# Patient Record
Sex: FEMALE | Race: BLACK OR AFRICAN AMERICAN | NOT HISPANIC OR LATINO | URBAN - METROPOLITAN AREA
[De-identification: names, ages, dates, MRNs, and addresses within clinical notes are randomized per-mention and may not be internally consistent; named-entity substitution may affect disease eponyms.]

---

## 2020-12-03 ENCOUNTER — APPOINTMENT (RX ONLY)
Dept: URBAN - METROPOLITAN AREA CLINIC 116 | Facility: CLINIC | Age: 65
Setting detail: DERMATOLOGY
End: 2020-12-03

## 2020-12-03 DIAGNOSIS — L30.4 ERYTHEMA INTERTRIGO: ICD-10-CM

## 2020-12-03 DIAGNOSIS — L29.89 OTHER PRURITUS: ICD-10-CM

## 2020-12-03 DIAGNOSIS — K13.0 DISEASES OF LIPS: ICD-10-CM

## 2020-12-03 PROBLEM — L29.8 OTHER PRURITUS: Status: ACTIVE | Noted: 2020-12-03

## 2020-12-03 PROCEDURE — 99202 OFFICE O/P NEW SF 15 MIN: CPT

## 2020-12-03 PROCEDURE — ? COUNSELING

## 2020-12-03 PROCEDURE — ? PRESCRIPTION

## 2020-12-03 PROCEDURE — ? TREATMENT REGIMEN

## 2020-12-03 RX ORDER — CICLOPIROX OLAMINE 7.7 MG/G
CREAM TOPICAL
Qty: 1 | Refills: 0 | Status: ERX | COMMUNITY
Start: 2020-12-03

## 2020-12-03 RX ORDER — TRIAMCINOLONE ACETONIDE 0.25 MG/G
CREAM TOPICAL
Qty: 1 | Refills: 0 | Status: ERX | COMMUNITY
Start: 2020-12-03

## 2020-12-03 RX ORDER — AMOXICILLIN AND CLAVULANATE POTASSIUM 875; 125 MG/1; 1/1
1 TAB TABLET, FILM COATED ORAL
Qty: 20 | Refills: 0 | Status: ERX | COMMUNITY
Start: 2020-12-03

## 2020-12-03 RX ADMIN — TRIAMCINOLONE ACETONIDE 1: 0.25 CREAM TOPICAL at 00:00

## 2020-12-03 RX ADMIN — CICLOPIROX OLAMINE: 7.7 CREAM TOPICAL at 00:00

## 2020-12-03 RX ADMIN — AMOXICILLIN AND CLAVULANATE POTASSIUM 1 TAB: 875; 125 TABLET, FILM COATED ORAL at 00:00

## 2020-12-03 ASSESSMENT — LOCATION ZONE DERM
LOCATION ZONE: LIP
LOCATION ZONE: TRUNK
LOCATION ZONE: FACE

## 2020-12-03 ASSESSMENT — LOCATION SIMPLE DESCRIPTION DERM
LOCATION SIMPLE: LEFT LIP
LOCATION SIMPLE: ABDOMEN
LOCATION SIMPLE: RIGHT UPPER BACK
LOCATION SIMPLE: RIGHT LIP
LOCATION SIMPLE: RIGHT CHEEK

## 2020-12-03 ASSESSMENT — LOCATION DETAILED DESCRIPTION DERM
LOCATION DETAILED: RIGHT MEDIAL UPPER BACK
LOCATION DETAILED: RIGHT LATERAL ABDOMEN
LOCATION DETAILED: LEFT LATERAL ABDOMEN
LOCATION DETAILED: LEFT LOWER CUTANEOUS LIP
LOCATION DETAILED: RIGHT UPPER CUTANEOUS LIP
LOCATION DETAILED: RIGHT MEDIAL BUCCAL CHEEK
LOCATION DETAILED: LEFT UPPER CUTANEOUS LIP
LOCATION DETAILED: LEFT ORAL COMMISSURE

## 2020-12-29 ENCOUNTER — APPOINTMENT (RX ONLY)
Dept: URBAN - METROPOLITAN AREA CLINIC 116 | Facility: CLINIC | Age: 65
Setting detail: DERMATOLOGY
End: 2020-12-29

## 2020-12-29 DIAGNOSIS — L80 VITILIGO: ICD-10-CM

## 2020-12-29 DIAGNOSIS — L30.4 ERYTHEMA INTERTRIGO: ICD-10-CM | Status: RESOLVED

## 2020-12-29 PROCEDURE — ? TREATMENT REGIMEN

## 2020-12-29 PROCEDURE — ? PRESCRIPTION

## 2020-12-29 PROCEDURE — ? COUNSELING

## 2020-12-29 PROCEDURE — 99213 OFFICE O/P EST LOW 20 MIN: CPT

## 2020-12-29 RX ORDER — TACROLIMUS 0.3 MG/G
OINTMENT TOPICAL BID
Qty: 1 | Refills: 5 | Status: ERX | COMMUNITY
Start: 2020-12-29

## 2020-12-29 RX ADMIN — TACROLIMUS 1: 0.3 OINTMENT TOPICAL at 00:00

## 2020-12-29 ASSESSMENT — LOCATION SIMPLE DESCRIPTION DERM
LOCATION SIMPLE: RIGHT SMALL FINGER
LOCATION SIMPLE: LEFT RING FINGER
LOCATION SIMPLE: LEFT FOREHEAD
LOCATION SIMPLE: RIGHT LIP
LOCATION SIMPLE: RIGHT EYEBROW
LOCATION SIMPLE: LEFT EYEBROW
LOCATION SIMPLE: LEFT LIP
LOCATION SIMPLE: LEFT SMALL FINGER
LOCATION SIMPLE: ABDOMEN

## 2020-12-29 ASSESSMENT — LOCATION DETAILED DESCRIPTION DERM
LOCATION DETAILED: RIGHT MID DORSAL SMALL FINGER
LOCATION DETAILED: LEFT DISTAL DORSAL SMALL FINGER
LOCATION DETAILED: RIGHT LATERAL EYEBROW
LOCATION DETAILED: LEFT INFERIOR VERMILION LIP
LOCATION DETAILED: RIGHT LATERAL ABDOMEN
LOCATION DETAILED: LEFT LATERAL EYEBROW
LOCATION DETAILED: LEFT SUPERIOR VERMILION LIP
LOCATION DETAILED: LEFT RING FINGER DISTAL INTERPHALANGEAL JOINT
LOCATION DETAILED: RIGHT UPPER CUTANEOUS LIP
LOCATION DETAILED: LEFT INFERIOR LATERAL FOREHEAD
LOCATION DETAILED: LEFT LATERAL ABDOMEN

## 2020-12-29 ASSESSMENT — LOCATION ZONE DERM
LOCATION ZONE: TRUNK
LOCATION ZONE: LIP
LOCATION ZONE: FINGER
LOCATION ZONE: FACE

## 2021-05-27 ENCOUNTER — APPOINTMENT (RX ONLY)
Dept: URBAN - METROPOLITAN AREA CLINIC 116 | Facility: CLINIC | Age: 66
Setting detail: DERMATOLOGY
End: 2021-05-27

## 2021-05-27 VITALS — TEMPERATURE: 97.3 F

## 2021-05-27 DIAGNOSIS — L80 VITILIGO: ICD-10-CM

## 2021-05-27 PROCEDURE — ? TREATMENT REGIMEN

## 2021-05-27 PROCEDURE — 99213 OFFICE O/P EST LOW 20 MIN: CPT

## 2021-05-27 PROCEDURE — ? COUNSELING

## 2021-05-27 PROCEDURE — ? PRESCRIPTION

## 2021-05-27 RX ORDER — TACROLIMUS 1 MG/G
1 OINTMENT TOPICAL BID
Qty: 1 | Refills: 3 | Status: ERX | COMMUNITY
Start: 2021-05-27

## 2021-05-27 RX ORDER — HYDROCORTISONE VALERATE 2 MG/G
1 OINTMENT TOPICAL QD
Qty: 1 | Refills: 0 | Status: ERX | COMMUNITY
Start: 2021-05-27

## 2021-05-27 RX ADMIN — HYDROCORTISONE VALERATE 1: 2 OINTMENT TOPICAL at 00:00

## 2021-05-27 RX ADMIN — TACROLIMUS 1: 1 OINTMENT TOPICAL at 00:00

## 2021-05-27 ASSESSMENT — LOCATION DETAILED DESCRIPTION DERM
LOCATION DETAILED: LEFT SUPERIOR VERMILION LIP
LOCATION DETAILED: LEFT INFERIOR VERMILION LIP
LOCATION DETAILED: RIGHT MID DORSAL SMALL FINGER
LOCATION DETAILED: RIGHT UPPER CUTANEOUS LIP
LOCATION DETAILED: LEFT DISTAL DORSAL SMALL FINGER
LOCATION DETAILED: RIGHT LATERAL EYEBROW
LOCATION DETAILED: LEFT INFERIOR LATERAL FOREHEAD
LOCATION DETAILED: LEFT LATERAL EYEBROW
LOCATION DETAILED: LEFT RING FINGER DISTAL INTERPHALANGEAL JOINT

## 2021-05-27 ASSESSMENT — LOCATION SIMPLE DESCRIPTION DERM
LOCATION SIMPLE: RIGHT EYEBROW
LOCATION SIMPLE: LEFT SMALL FINGER
LOCATION SIMPLE: LEFT RING FINGER
LOCATION SIMPLE: LEFT FOREHEAD
LOCATION SIMPLE: RIGHT LIP
LOCATION SIMPLE: LEFT EYEBROW
LOCATION SIMPLE: RIGHT SMALL FINGER
LOCATION SIMPLE: LEFT LIP

## 2021-05-27 ASSESSMENT — LOCATION ZONE DERM
LOCATION ZONE: FINGER
LOCATION ZONE: FACE
LOCATION ZONE: LIP

## 2021-05-27 NOTE — PROCEDURE: TREATMENT REGIMEN
Detail Level: Simple
Initiate Treatment: Lidex ointment Daily Monday- Thursday at night
Continue Regimen: Protopic daily to lip.
Plan: Patient is to see Renford Aschoff for further treatment options.

## 2021-07-01 ENCOUNTER — APPOINTMENT (RX ONLY)
Dept: URBAN - METROPOLITAN AREA CLINIC 116 | Facility: CLINIC | Age: 66
Setting detail: DERMATOLOGY
End: 2021-07-01

## 2021-07-01 DIAGNOSIS — L80 VITILIGO: ICD-10-CM

## 2021-07-01 PROCEDURE — ? MEDICAL CONSULTATION: EXCIMER LASER

## 2021-07-01 PROCEDURE — ? PRESCRIPTION MEDICATION MANAGEMENT

## 2021-07-01 PROCEDURE — ? COUNSELING

## 2021-07-01 PROCEDURE — ? NARROW BAND UVB ORDER

## 2021-07-01 PROCEDURE — 99214 OFFICE O/P EST MOD 30 MIN: CPT

## 2021-07-01 ASSESSMENT — LOCATION DETAILED DESCRIPTION DERM
LOCATION DETAILED: LEFT DISTAL DORSAL SMALL FINGER
LOCATION DETAILED: LEFT SUPERIOR VERMILION LIP
LOCATION DETAILED: RIGHT RADIAL DORSAL HAND
LOCATION DETAILED: LEFT LATERAL EYEBROW
LOCATION DETAILED: LEFT INFERIOR LATERAL FOREHEAD
LOCATION DETAILED: RIGHT UPPER CUTANEOUS LIP
LOCATION DETAILED: LEFT INFERIOR VERMILION LIP
LOCATION DETAILED: LEFT RING FINGER DISTAL INTERPHALANGEAL JOINT
LOCATION DETAILED: RIGHT MID DORSAL SMALL FINGER
LOCATION DETAILED: 4TH WEB SPACE LEFT HAND
LOCATION DETAILED: LEFT INFERIOR MEDIAL FOREHEAD
LOCATION DETAILED: RIGHT LATERAL EYEBROW

## 2021-07-01 ASSESSMENT — LOCATION SIMPLE DESCRIPTION DERM
LOCATION SIMPLE: RIGHT HAND
LOCATION SIMPLE: LEFT LIP
LOCATION SIMPLE: LEFT EYEBROW
LOCATION SIMPLE: LEFT FOREHEAD
LOCATION SIMPLE: LEFT SMALL FINGER
LOCATION SIMPLE: RIGHT SMALL FINGER
LOCATION SIMPLE: LEFT RING FINGER
LOCATION SIMPLE: LEFT HAND
LOCATION SIMPLE: RIGHT LIP
LOCATION SIMPLE: RIGHT EYEBROW

## 2021-07-01 ASSESSMENT — LOCATION ZONE DERM
LOCATION ZONE: FACE
LOCATION ZONE: FINGER
LOCATION ZONE: HAND
LOCATION ZONE: LIP

## 2021-07-01 NOTE — PROCEDURE: PRESCRIPTION MEDICATION MANAGEMENT
Detail Level: Zone
Render In Strict Bullet Format?: No
Continue Regimen: Lidex ointment Daily Monday- Thursday at night. \\nProtopic daily to lip.
Plan for Excimer laser if not covered plan NBUVB

## 2021-07-01 NOTE — PROCEDURE: NARROW BAND UVB ORDER
Additional Comments: 3 x 30\\nSkin type 1
Detail Level: Generalized
Consent: Written consent obtained. The risks were reviewed with the patient including but not limited to: burn, pigmentary changes, pain, blistering, scabbing, redness, increased risk of skin cancers, and the remote possibility of scarring.
Frequency: TIW
Protocol: NBUVB
Dose: to be determined by the phototherapy office
Alcohol induced circadian rhythm sleep disorders

## 2021-07-27 ENCOUNTER — APPOINTMENT (RX ONLY)
Dept: URBAN - METROPOLITAN AREA CLINIC 116 | Facility: CLINIC | Age: 66
Setting detail: DERMATOLOGY
End: 2021-07-27

## 2021-07-27 DIAGNOSIS — L80 VITILIGO: ICD-10-CM

## 2021-07-27 PROCEDURE — 96920 EXCIMER LSR PSRIASIS<250SQCM: CPT

## 2021-07-27 PROCEDURE — ? EXCIMER LASER

## 2021-07-27 ASSESSMENT — LOCATION SIMPLE DESCRIPTION DERM: LOCATION SIMPLE: RIGHT CHEEK

## 2021-07-27 ASSESSMENT — LOCATION DETAILED DESCRIPTION DERM: LOCATION DETAILED: RIGHT INFERIOR CENTRAL MALAR CHEEK

## 2021-07-27 ASSESSMENT — LOCATION ZONE DERM: LOCATION ZONE: FACE

## 2021-07-27 NOTE — PROCEDURE: EXCIMER LASER
Consent: Written consent obtained, risks reviewed including but not limited to crusting, scabbing, blistering, scarring, darker or lighter pigmentary change, incidental hair removal, bruising, and/or incomplete removal.
Mode: tile
Post-Care Instructions: I reviewed with the patient in detail post-care instructions. Patient should stay away from the sun and wear sun protection until treated areas are fully healed.
Device Serial Number (Optional): [email or web address]
Detail Level: Detailed
Location #1: face and fingers
Total Square Area In Cm2 (Required For Proper Billing- Whole Numbers Only Please): 95
Fluence #1 (J/Cm2 Or Mj/Cm2): 150 mj
Fluence Units: mJ/cm2
Spot Size: 2 x 2 cm
Treatment Number: 1

## 2021-07-29 ENCOUNTER — APPOINTMENT (RX ONLY)
Dept: URBAN - METROPOLITAN AREA CLINIC 116 | Facility: CLINIC | Age: 66
Setting detail: DERMATOLOGY
End: 2021-07-29

## 2021-08-03 ENCOUNTER — APPOINTMENT (RX ONLY)
Dept: URBAN - METROPOLITAN AREA CLINIC 116 | Facility: CLINIC | Age: 66
Setting detail: DERMATOLOGY
End: 2021-08-03

## 2021-08-03 DIAGNOSIS — L80 VITILIGO: ICD-10-CM

## 2021-08-03 PROCEDURE — 96920 EXCIMER LSR PSRIASIS<250SQCM: CPT

## 2021-08-03 PROCEDURE — ? EXCIMER LASER

## 2021-08-03 ASSESSMENT — LOCATION SIMPLE DESCRIPTION DERM: LOCATION SIMPLE: RIGHT CHEEK

## 2021-08-03 ASSESSMENT — LOCATION ZONE DERM: LOCATION ZONE: FACE

## 2021-08-03 ASSESSMENT — LOCATION DETAILED DESCRIPTION DERM: LOCATION DETAILED: RIGHT INFERIOR CENTRAL MALAR CHEEK

## 2021-08-05 ENCOUNTER — APPOINTMENT (RX ONLY)
Dept: URBAN - METROPOLITAN AREA CLINIC 116 | Facility: CLINIC | Age: 66
Setting detail: DERMATOLOGY
End: 2021-08-05

## 2021-08-05 DIAGNOSIS — L80 VITILIGO: ICD-10-CM

## 2021-08-05 PROCEDURE — 96920 EXCIMER LSR PSRIASIS<250SQCM: CPT

## 2021-08-05 PROCEDURE — ? EXCIMER LASER

## 2021-08-05 ASSESSMENT — LOCATION SIMPLE DESCRIPTION DERM: LOCATION SIMPLE: RIGHT CHEEK

## 2021-08-05 ASSESSMENT — LOCATION ZONE DERM: LOCATION ZONE: FACE

## 2021-08-05 ASSESSMENT — LOCATION DETAILED DESCRIPTION DERM: LOCATION DETAILED: RIGHT INFERIOR CENTRAL MALAR CHEEK

## 2021-08-05 NOTE — PROCEDURE: EXCIMER LASER
Consent: Written consent obtained, risks reviewed including but not limited to crusting, scabbing, blistering, scarring, darker or lighter pigmentary change, incidental hair removal, bruising, and/or incomplete removal.
Mode: tile
Post-Care Instructions: I reviewed with the patient in detail post-care instructions. Patient should stay away from the sun and wear sun protection until treated areas are fully healed.
Device Serial Number (Optional): [email or web address]
Detail Level: Detailed
Location #1: face and fingers
Total Square Area In Cm2 (Required For Proper Billing- Whole Numbers Only Please): [address]
Fluence #1 (J/Cm2 Or Mj/Cm2): 220  mj
Fluence Units: mJ/cm2
Spot Size: 2 x 2 cm
Treatment Number: 3

## 2021-08-10 ENCOUNTER — APPOINTMENT (RX ONLY)
Dept: URBAN - METROPOLITAN AREA CLINIC 116 | Facility: CLINIC | Age: 66
Setting detail: DERMATOLOGY
End: 2021-08-10

## 2021-08-10 DIAGNOSIS — L80 VITILIGO: ICD-10-CM

## 2021-08-10 PROCEDURE — 96920 EXCIMER LSR PSRIASIS<250SQCM: CPT

## 2021-08-10 PROCEDURE — ? EXCIMER LASER

## 2021-08-10 ASSESSMENT — LOCATION SIMPLE DESCRIPTION DERM: LOCATION SIMPLE: RIGHT CHEEK

## 2021-08-10 ASSESSMENT — LOCATION ZONE DERM: LOCATION ZONE: FACE

## 2021-08-10 ASSESSMENT — LOCATION DETAILED DESCRIPTION DERM: LOCATION DETAILED: RIGHT INFERIOR CENTRAL MALAR CHEEK

## 2021-08-10 NOTE — PROCEDURE: EXCIMER LASER
Consent: Written consent obtained, risks reviewed including but not limited to crusting, scabbing, blistering, scarring, darker or lighter pigmentary change, incidental hair removal, bruising, and/or incomplete removal.
Mode: tile
Post-Care Instructions: I reviewed with the patient in detail post-care instructions. Patient should stay away from the sun and wear sun protection until treated areas are fully healed.
Device Serial Number (Optional): [email or web address]
Detail Level: Detailed
Location #1: face and fingers
Total Square Area In Cm2 (Required For Proper Billing- Whole Numbers Only Please): One [facility]
Fluence #1 (J/Cm2 Or Mj/Cm2): 270  mj
Fluence Units: mJ/cm2
Spot Size: 2 x 2 cm
Treatment Number: 4

## 2021-08-12 ENCOUNTER — APPOINTMENT (RX ONLY)
Dept: URBAN - METROPOLITAN AREA CLINIC 116 | Facility: CLINIC | Age: 66
Setting detail: DERMATOLOGY
End: 2021-08-12

## 2021-08-12 DIAGNOSIS — L80 VITILIGO: ICD-10-CM

## 2021-08-12 PROCEDURE — ? EXCIMER LASER

## 2021-08-12 PROCEDURE — 96920 EXCIMER LSR PSRIASIS<250SQCM: CPT

## 2021-08-12 ASSESSMENT — LOCATION ZONE DERM: LOCATION ZONE: FACE

## 2021-08-12 ASSESSMENT — LOCATION SIMPLE DESCRIPTION DERM: LOCATION SIMPLE: RIGHT CHEEK

## 2021-08-12 ASSESSMENT — LOCATION DETAILED DESCRIPTION DERM: LOCATION DETAILED: RIGHT INFERIOR CENTRAL MALAR CHEEK

## 2021-08-12 NOTE — PROCEDURE: EXCIMER LASER
Consent: Written consent obtained, risks reviewed including but not limited to crusting, scabbing, blistering, scarring, darker or lighter pigmentary change, incidental hair removal, bruising, and/or incomplete removal.
Mode: tile
Post-Care Instructions: I reviewed with the patient in detail post-care instructions. Patient should stay away from the sun and wear sun protection until treated areas are fully healed.
Device Serial Number (Optional): [email or web address]
Detail Level: Detailed
Location #1: face and fingers
Total Square Area In Cm2 (Required For Proper Billing- Whole Numbers Only Please): One [facility]
Fluence #1 (J/Cm2 Or Mj/Cm2): 330 mj
Fluence Units: mJ/cm2
Spot Size: 2 x 2 cm
Treatment Number: 5

## 2021-08-19 ENCOUNTER — APPOINTMENT (RX ONLY)
Dept: URBAN - METROPOLITAN AREA CLINIC 116 | Facility: CLINIC | Age: 66
Setting detail: DERMATOLOGY
End: 2021-08-19

## 2021-08-19 DIAGNOSIS — L80 VITILIGO: ICD-10-CM

## 2021-08-19 PROCEDURE — ? EXCIMER LASER

## 2021-08-19 PROCEDURE — 96920 EXCIMER LSR PSRIASIS<250SQCM: CPT

## 2021-08-19 ASSESSMENT — LOCATION ZONE DERM: LOCATION ZONE: FACE

## 2021-08-19 ASSESSMENT — LOCATION SIMPLE DESCRIPTION DERM: LOCATION SIMPLE: RIGHT CHEEK

## 2021-08-19 ASSESSMENT — LOCATION DETAILED DESCRIPTION DERM: LOCATION DETAILED: RIGHT INFERIOR CENTRAL MALAR CHEEK

## 2021-08-19 NOTE — PROCEDURE: EXCIMER LASER
Consent: Written consent obtained, risks reviewed including but not limited to crusting, scabbing, blistering, scarring, darker or lighter pigmentary change, incidental hair removal, bruising, and/or incomplete removal.
Mode: tile
Post-Care Instructions: I reviewed with the patient in detail post-care instructions. Patient should stay away from the sun and wear sun protection until treated areas are fully healed.
Device Serial Number (Optional): [email or web address]/[email or web address]
Detail Level: Detailed
Location #1: face and fingers
Total Square Area In Cm2 (Required For Proper Billing- Whole Numbers Only Please): One [facility]
Fluence #1 (J/Cm2 Or Mj/Cm2): 360  mj
Fluence Units: mJ/cm2
Spot Size: 2 x 2 cm
Treatment Number: 6

## 2021-08-23 ENCOUNTER — APPOINTMENT (RX ONLY)
Dept: URBAN - METROPOLITAN AREA CLINIC 116 | Facility: CLINIC | Age: 66
Setting detail: DERMATOLOGY
End: 2021-08-23

## 2021-08-23 DIAGNOSIS — L80 VITILIGO: ICD-10-CM

## 2021-08-23 PROCEDURE — 96920 EXCIMER LSR PSRIASIS<250SQCM: CPT

## 2021-08-23 PROCEDURE — ? EXCIMER LASER

## 2021-08-23 ASSESSMENT — LOCATION DETAILED DESCRIPTION DERM: LOCATION DETAILED: RIGHT INFERIOR CENTRAL MALAR CHEEK

## 2021-08-23 ASSESSMENT — LOCATION SIMPLE DESCRIPTION DERM: LOCATION SIMPLE: RIGHT CHEEK

## 2021-08-23 ASSESSMENT — LOCATION ZONE DERM: LOCATION ZONE: FACE

## 2021-08-23 NOTE — PROCEDURE: EXCIMER LASER
Consent: Written consent obtained, risks reviewed including but not limited to crusting, scabbing, blistering, scarring, darker or lighter pigmentary change, incidental hair removal, bruising, and/or incomplete removal.
Mode: tile
Post-Care Instructions: I reviewed with the patient in detail post-care instructions. Patient should stay away from the sun and wear sun protection until treated areas are fully healed.
Device Serial Number (Optional): [email or web address]/[email or web address]
Detail Level: Detailed
Location #1: face and fingers
Total Square Area In Cm2 (Required For Proper Billing- Whole Numbers Only Please): [address]
Fluence #1 (J/Cm2 Or Mj/Cm2): 420   mj
Fluence Units: mJ/cm2
Spot Size: 2 x 2 cm
Treatment Number: 7

## 2021-08-26 ENCOUNTER — APPOINTMENT (RX ONLY)
Dept: URBAN - METROPOLITAN AREA CLINIC 116 | Facility: CLINIC | Age: 66
Setting detail: DERMATOLOGY
End: 2021-08-26

## 2021-08-26 DIAGNOSIS — L80 VITILIGO: ICD-10-CM

## 2021-08-26 PROCEDURE — ? EXCIMER LASER

## 2021-08-26 PROCEDURE — 96920 EXCIMER LSR PSRIASIS<250SQCM: CPT

## 2021-08-26 ASSESSMENT — LOCATION ZONE DERM: LOCATION ZONE: FACE

## 2021-08-26 ASSESSMENT — LOCATION SIMPLE DESCRIPTION DERM: LOCATION SIMPLE: RIGHT CHEEK

## 2021-08-26 ASSESSMENT — LOCATION DETAILED DESCRIPTION DERM: LOCATION DETAILED: RIGHT INFERIOR CENTRAL MALAR CHEEK

## 2021-08-26 NOTE — PROCEDURE: EXCIMER LASER
Consent: Written consent obtained, risks reviewed including but not limited to crusting, scabbing, blistering, scarring, darker or lighter pigmentary change, incidental hair removal, bruising, and/or incomplete removal.
Mode: tile
Post-Care Instructions: I reviewed with the patient in detail post-care instructions. Patient should stay away from the sun and wear sun protection until treated areas are fully healed.
Device Serial Number (Optional): [email or web address]
Detail Level: Detailed
Location #1: face and fingers
Total Square Area In Cm2 (Required For Proper Billing- Whole Numbers Only Please): [address]
Fluence #1 (J/Cm2 Or Mj/Cm2): 450   mj
Fluence Units: mJ/cm2
Spot Size: 2 x 2 cm
Treatment Number: 8

## 2021-08-31 ENCOUNTER — APPOINTMENT (RX ONLY)
Dept: URBAN - METROPOLITAN AREA CLINIC 116 | Facility: CLINIC | Age: 66
Setting detail: DERMATOLOGY
End: 2021-08-31

## 2021-08-31 DIAGNOSIS — L80 VITILIGO: ICD-10-CM

## 2021-08-31 PROCEDURE — 96920 EXCIMER LSR PSRIASIS<250SQCM: CPT

## 2021-08-31 PROCEDURE — ? EXCIMER LASER

## 2021-08-31 ASSESSMENT — LOCATION ZONE DERM: LOCATION ZONE: FACE

## 2021-08-31 ASSESSMENT — LOCATION DETAILED DESCRIPTION DERM: LOCATION DETAILED: RIGHT INFERIOR CENTRAL MALAR CHEEK

## 2021-08-31 ASSESSMENT — LOCATION SIMPLE DESCRIPTION DERM: LOCATION SIMPLE: RIGHT CHEEK

## 2021-08-31 NOTE — PROCEDURE: EXCIMER LASER
Consent: Written consent obtained, risks reviewed including but not limited to crusting, scabbing, blistering, scarring, darker or lighter pigmentary change, incidental hair removal, bruising, and/or incomplete removal.
Mode: tile
Post-Care Instructions: I reviewed with the patient in detail post-care instructions. Patient should stay away from the sun and wear sun protection until treated areas are fully healed.
Detail Level: Detailed
Location #1: face and fingers
Comments: Pt. states she tolerated last tx well, no complaints.
Total Square Area In Cm2 (Required For Proper Billing- Whole Numbers Only Please): [address] [facility] [address]
Fluence #1 (J/Cm2 Or Mj/Cm2): 450 mJ
Fluence Units: mJ/cm2
Spot Size: 3 x 3 cm
Treatment Number: 9

## 2021-09-02 ENCOUNTER — APPOINTMENT (RX ONLY)
Dept: URBAN - METROPOLITAN AREA CLINIC 116 | Facility: CLINIC | Age: 66
Setting detail: DERMATOLOGY
End: 2021-09-02

## 2021-09-02 DIAGNOSIS — L80 VITILIGO: ICD-10-CM

## 2021-09-02 PROCEDURE — 96920 EXCIMER LSR PSRIASIS<250SQCM: CPT

## 2021-09-02 PROCEDURE — ? EXCIMER LASER

## 2021-09-02 ASSESSMENT — LOCATION SIMPLE DESCRIPTION DERM: LOCATION SIMPLE: RIGHT CHEEK

## 2021-09-02 ASSESSMENT — LOCATION DETAILED DESCRIPTION DERM: LOCATION DETAILED: RIGHT INFERIOR CENTRAL MALAR CHEEK

## 2021-09-02 ASSESSMENT — LOCATION ZONE DERM: LOCATION ZONE: FACE

## 2021-09-02 NOTE — PROCEDURE: EXCIMER LASER
Consent: Written consent obtained, risks reviewed including but not limited to crusting, scabbing, blistering, scarring, darker or lighter pigmentary change, incidental hair removal, bruising, and/or incomplete removal.
Mode: tile
Post-Care Instructions: I reviewed with the patient in detail post-care instructions. Patient should stay away from the sun and wear sun protection until treated areas are fully healed.
Detail Level: Detailed
Location #1: face and fingers
Comments: Pt. states she tolerated last tx well, no complaints.
Total Square Area In Cm2 (Required For Proper Billing- Whole Numbers Only Please): One [facility]
Fluence #1 (J/Cm2 Or Mj/Cm2): 540 mJ
Fluence Units: mJ/cm2
Spot Size: 3 x 3 cm
Treatment Number: 10

## 2021-09-07 ENCOUNTER — APPOINTMENT (RX ONLY)
Dept: URBAN - METROPOLITAN AREA CLINIC 116 | Facility: CLINIC | Age: 66
Setting detail: DERMATOLOGY
End: 2021-09-07

## 2021-09-07 DIAGNOSIS — L80 VITILIGO: ICD-10-CM

## 2021-09-07 PROCEDURE — 96920 EXCIMER LSR PSRIASIS<250SQCM: CPT

## 2021-09-07 PROCEDURE — ? EXCIMER LASER

## 2021-09-07 ASSESSMENT — LOCATION ZONE DERM
LOCATION ZONE: FACE
LOCATION ZONE: FINGER

## 2021-09-07 ASSESSMENT — LOCATION DETAILED DESCRIPTION DERM
LOCATION DETAILED: RIGHT MID DORSAL MIDDLE FINGER
LOCATION DETAILED: LEFT MID DORSAL MIDDLE FINGER
LOCATION DETAILED: RIGHT INFERIOR CENTRAL MALAR CHEEK

## 2021-09-07 ASSESSMENT — LOCATION SIMPLE DESCRIPTION DERM
LOCATION SIMPLE: LEFT MIDDLE FINGER
LOCATION SIMPLE: RIGHT CHEEK
LOCATION SIMPLE: RIGHT MIDDLE FINGER

## 2021-09-07 NOTE — PROCEDURE: EXCIMER LASER
Consent: Written consent obtained, risks reviewed including but not limited to crusting, scabbing, blistering, scarring, darker or lighter pigmentary change, incidental hair removal, bruising, and/or incomplete removal.
Mode: tile
Post-Care Instructions: I reviewed with the patient in detail post-care instructions. Patient should stay away from the sun and wear sun protection until treated areas are fully healed.
Detail Level: Detailed
Location #1: face and fingers
Comments on Previous Treatment: Pt. states she tolerated last tx well, no complaints. \\nChanges in Treatment Protocol: Decrease Excimer Laser dosing by 40% per Shauna Ahumada with [address] 9/7/21.
Total Square Area In Cm2 (Required For Proper Billing- Whole Numbers Only Please): [id number] 
Fluence #1 (J/Cm2 Or Mj/Cm2): [address] [facility]
Fluence Units: mJ/cm2
Spot Size: 3 x 3 cm
Treatment Number: [address]

## 2021-09-09 ENCOUNTER — APPOINTMENT (RX ONLY)
Dept: URBAN - METROPOLITAN AREA CLINIC 116 | Facility: CLINIC | Age: 66
Setting detail: DERMATOLOGY
End: 2021-09-09

## 2021-09-09 DIAGNOSIS — L80 VITILIGO: ICD-10-CM

## 2021-09-09 PROCEDURE — 96920 EXCIMER LSR PSRIASIS<250SQCM: CPT

## 2021-09-09 PROCEDURE — ? EXCIMER LASER

## 2021-09-09 ASSESSMENT — LOCATION DETAILED DESCRIPTION DERM
LOCATION DETAILED: RIGHT INFERIOR CENTRAL MALAR CHEEK
LOCATION DETAILED: LEFT MID DORSAL MIDDLE FINGER
LOCATION DETAILED: RIGHT MID DORSAL MIDDLE FINGER

## 2021-09-09 ASSESSMENT — LOCATION SIMPLE DESCRIPTION DERM
LOCATION SIMPLE: RIGHT MIDDLE FINGER
LOCATION SIMPLE: RIGHT CHEEK
LOCATION SIMPLE: LEFT MIDDLE FINGER

## 2021-09-09 ASSESSMENT — LOCATION ZONE DERM
LOCATION ZONE: FACE
LOCATION ZONE: FINGER

## 2021-09-09 NOTE — PROCEDURE: EXCIMER LASER
Consent: Written consent obtained, risks reviewed including but not limited to crusting, scabbing, blistering, scarring, darker or lighter pigmentary change, incidental hair removal, bruising, and/or incomplete removal.
Mode: tile
Post-Care Instructions: I reviewed with the patient in detail post-care instructions. Patient should stay away from the sun and wear sun protection until treated areas are fully healed.
Detail Level: Detailed
Location #1: face and fingers
Comments on Previous Treatment: Pt. states she tolerated last tx well, no complaints. \\nChanges in Treatment Protocol: Decrease Excimer Laser dosing by 40% per Otilia Son 51 9/7/21.
Total Square Area In Cm2 (Required For Proper Billing- Whole Numbers Only Please): Tye
Fluence #1 (J/Cm2 Or Mj/Cm2): [address] [facility]
Fluence Units: mJ/cm2
Spot Size: 3 x 3 cm
Treatment Number: 145 [address]

## 2021-09-14 ENCOUNTER — APPOINTMENT (RX ONLY)
Dept: URBAN - METROPOLITAN AREA CLINIC 116 | Facility: CLINIC | Age: 66
Setting detail: DERMATOLOGY
End: 2021-09-14

## 2021-09-14 DIAGNOSIS — L80 VITILIGO: ICD-10-CM

## 2021-09-14 PROCEDURE — 96920 EXCIMER LSR PSRIASIS<250SQCM: CPT

## 2021-09-14 PROCEDURE — ? EXCIMER LASER

## 2021-09-14 ASSESSMENT — LOCATION ZONE DERM
LOCATION ZONE: FACE
LOCATION ZONE: FINGER

## 2021-09-14 ASSESSMENT — LOCATION SIMPLE DESCRIPTION DERM
LOCATION SIMPLE: LEFT MIDDLE FINGER
LOCATION SIMPLE: RIGHT MIDDLE FINGER
LOCATION SIMPLE: RIGHT CHEEK

## 2021-09-14 NOTE — PROCEDURE: EXCIMER LASER
Consent: Written consent obtained, risks reviewed including but not limited to crusting, scabbing, blistering, scarring, darker or lighter pigmentary change, incidental hair removal, bruising, and/or incomplete removal.
Mode: tile
Post-Care Instructions: I reviewed with the patient in detail post-care instructions. Patient should stay away from the sun and wear sun protection until treated areas are fully healed.
Detail Level: Detailed
Location #1: face and fingers
Comments on Previous Treatment: Pt. states she tolerated last tx well, no complaints. \\nChanges in Treatment Protocol: Decrease Excimer Laser dosing by 40% per Uli Son 51 9/7/21.
Total Square Area In Cm2 (Required For Proper Billing- Whole Numbers Only Please): Tye
Fluence #1 (J/Cm2 Or Mj/Cm2): 333 [facility]
Fluence Units: mJ/cm2
Spot Size: 3 x 3 cm
Treatment Number: 145 [address]

## 2021-09-16 ENCOUNTER — APPOINTMENT (RX ONLY)
Dept: URBAN - METROPOLITAN AREA CLINIC 116 | Facility: CLINIC | Age: 66
Setting detail: DERMATOLOGY
End: 2021-09-16

## 2021-09-16 DIAGNOSIS — L80 VITILIGO: ICD-10-CM

## 2021-09-16 PROCEDURE — ? EXCIMER LASER

## 2021-09-16 PROCEDURE — 96920 EXCIMER LSR PSRIASIS<250SQCM: CPT

## 2021-09-16 ASSESSMENT — LOCATION DETAILED DESCRIPTION DERM
LOCATION DETAILED: LEFT MID DORSAL MIDDLE FINGER
LOCATION DETAILED: RIGHT MID DORSAL MIDDLE FINGER
LOCATION DETAILED: RIGHT INFERIOR CENTRAL MALAR CHEEK

## 2021-09-16 ASSESSMENT — LOCATION ZONE DERM
LOCATION ZONE: FACE
LOCATION ZONE: FINGER

## 2021-09-16 NOTE — PROCEDURE: EXCIMER LASER
Consent: Written consent obtained, risks reviewed including but not limited to crusting, scabbing, blistering, scarring, darker or lighter pigmentary change, incidental hair removal, bruising, and/or incomplete removal.
Mode: tile
Post-Care Instructions: I reviewed with the patient in detail post-care instructions. Patient should stay away from the sun and wear sun protection until treated areas are fully healed.
Detail Level: Detailed
Location #1: face and fingers
Comments on Previous Treatment: Pt. states she tolerated last tx well, no complaints. \\nChanges in Treatment Protocol: Decrease Excimer Laser dosing by 40% per Suraj Guppy with [address] 9/7/21.
Total Square Area In Cm2 (Required For Proper Billing- Whole Numbers Only Please): [address] [facility]
Fluence #1 (J/Cm2 Or Mj/Cm2): 480 mj
Fluence Units: mJ/cm2
Spot Size: 3 x 3 cm
Treatment Number: 914 [facility]

## 2021-09-21 ENCOUNTER — APPOINTMENT (RX ONLY)
Dept: URBAN - METROPOLITAN AREA CLINIC 116 | Facility: CLINIC | Age: 66
Setting detail: DERMATOLOGY
End: 2021-09-21

## 2021-09-21 DIAGNOSIS — L80 VITILIGO: ICD-10-CM

## 2021-09-21 PROCEDURE — 96920 EXCIMER LSR PSRIASIS<250SQCM: CPT

## 2021-09-21 PROCEDURE — ? EXCIMER LASER

## 2021-09-21 ASSESSMENT — LOCATION ZONE DERM
LOCATION ZONE: FACE
LOCATION ZONE: FINGER

## 2021-09-21 ASSESSMENT — LOCATION DETAILED DESCRIPTION DERM
LOCATION DETAILED: RIGHT MID DORSAL MIDDLE FINGER
LOCATION DETAILED: RIGHT INFERIOR CENTRAL MALAR CHEEK
LOCATION DETAILED: LEFT MID DORSAL MIDDLE FINGER

## 2021-09-21 NOTE — PROCEDURE: EXCIMER LASER
Consent: Written consent obtained, risks reviewed including but not limited to crusting, scabbing, blistering, scarring, darker or lighter pigmentary change, incidental hair removal, bruising, and/or incomplete removal.
Mode: tile
Post-Care Instructions: I reviewed with the patient in detail post-care instructions. Patient should stay away from the sun and wear sun protection until treated areas are fully healed.
Detail Level: Detailed
Location #1: face and fingers
Comments on Previous Treatment: Pt. states she tolerated last tx well, no complaints. \\nChanges in Treatment Protocol: Decrease Excimer Laser dosing by 40% per Dinah Son 51 9/7/21.
Total Square Area In Cm2 (Required For Proper Billing- Whole Numbers Only Please): [address] [facility]
Fluence #1 (J/Cm2 Or Mj/Cm2): 580 mj
Fluence Units: mJ/cm2
Spot Size: 3 x 3 cm
Treatment Number: 15

## 2021-09-23 ENCOUNTER — APPOINTMENT (RX ONLY)
Dept: URBAN - METROPOLITAN AREA CLINIC 116 | Facility: CLINIC | Age: 66
Setting detail: DERMATOLOGY
End: 2021-09-23

## 2021-09-23 DIAGNOSIS — L80 VITILIGO: ICD-10-CM

## 2021-09-23 PROCEDURE — ? EXCIMER LASER

## 2021-09-23 PROCEDURE — 96920 EXCIMER LSR PSRIASIS<250SQCM: CPT

## 2021-09-23 ASSESSMENT — LOCATION SIMPLE DESCRIPTION DERM
LOCATION SIMPLE: RIGHT CHEEK
LOCATION SIMPLE: RIGHT MIDDLE FINGER
LOCATION SIMPLE: LEFT MIDDLE FINGER

## 2021-09-23 ASSESSMENT — LOCATION ZONE DERM
LOCATION ZONE: FINGER
LOCATION ZONE: FACE

## 2021-09-23 NOTE — PROCEDURE: EXCIMER LASER
Consent: Written consent obtained, risks reviewed including but not limited to crusting, scabbing, blistering, scarring, darker or lighter pigmentary change, incidental hair removal, bruising, and/or incomplete removal.
Mode: tile
Post-Care Instructions: I reviewed with the patient in detail post-care instructions. Patient should stay away from the sun and wear sun protection until treated areas are fully healed.
Detail Level: Detailed
Location #1: face and fingers
Comments on Previous Treatment: Pt. states she tolerated last tx well, no complaints. \\nChanges in Treatment Protocol: Decrease Excimer Laser dosing by 40% per Justa Son 51 9/7/21.
Total Square Area In Cm2 (Required For Proper Billing- Whole Numbers Only Please): [address] [facility]
Fluence #1 (J/Cm2 Or Mj/Cm2): 700 mj
Fluence Units: mJ/cm2
Spot Size: 3 x 3 cm
Treatment Number: [address]

## 2021-09-28 ENCOUNTER — APPOINTMENT (RX ONLY)
Dept: URBAN - METROPOLITAN AREA CLINIC 116 | Facility: CLINIC | Age: 66
Setting detail: DERMATOLOGY
End: 2021-09-28

## 2021-09-28 DIAGNOSIS — L80 VITILIGO: ICD-10-CM

## 2021-09-28 PROCEDURE — ? EXCIMER LASER

## 2021-09-28 PROCEDURE — 96920 EXCIMER LSR PSRIASIS<250SQCM: CPT

## 2021-09-28 ASSESSMENT — LOCATION ZONE DERM
LOCATION ZONE: FACE
LOCATION ZONE: FINGER

## 2021-09-28 ASSESSMENT — LOCATION DETAILED DESCRIPTION DERM
LOCATION DETAILED: RIGHT INFERIOR CENTRAL MALAR CHEEK
LOCATION DETAILED: RIGHT MID DORSAL MIDDLE FINGER
LOCATION DETAILED: LEFT MID DORSAL MIDDLE FINGER

## 2021-09-28 NOTE — PROCEDURE: EXCIMER LASER
Consent: Written consent obtained, risks reviewed including but not limited to crusting, scabbing, blistering, scarring, darker or lighter pigmentary change, incidental hair removal, bruising, and/or incomplete removal.
Mode: tile
Post-Care Instructions: I reviewed with the patient in detail post-care instructions. Patient should stay away from the sun and wear sun protection until treated areas are fully healed.
Detail Level: Detailed
Location #1: face and fingers
Comments on Previous Treatment: Pt. states she tolerated last tx well, no complaints. \\nChanges in Treatment Protocol: Decrease Excimer Laser dosing by 40% per Wanda Rogers 9/7/21.
Total Square Area In Cm2 (Required For Proper Billing- Whole Numbers Only Please): [address] [facility]
Fluence #1 (J/Cm2 Or Mj/Cm2): 840  mj
Fluence Units: mJ/cm2
Spot Size: 3 x 3 cm
Treatment Number: [id number] [id number]

## 2021-09-30 ENCOUNTER — APPOINTMENT (RX ONLY)
Dept: URBAN - METROPOLITAN AREA CLINIC 116 | Facility: CLINIC | Age: 66
Setting detail: DERMATOLOGY
End: 2021-09-30

## 2021-09-30 DIAGNOSIS — L80 VITILIGO: ICD-10-CM

## 2021-09-30 PROCEDURE — 96920 EXCIMER LSR PSRIASIS<250SQCM: CPT

## 2021-09-30 PROCEDURE — ? EXCIMER LASER

## 2021-09-30 ASSESSMENT — LOCATION ZONE DERM
LOCATION ZONE: FACE
LOCATION ZONE: FINGER

## 2021-09-30 NOTE — PROCEDURE: EXCIMER LASER
Consent: Written consent obtained, risks reviewed including but not limited to crusting, scabbing, blistering, scarring, darker or lighter pigmentary change, incidental hair removal, bruising, and/or incomplete removal.
Mode: tile
Post-Care Instructions: I reviewed with the patient in detail post-care instructions. Patient should stay away from the sun and wear sun protection until treated areas are fully healed.
Detail Level: Detailed
Location #1: face and fingers
Comments on Previous Treatment: Pt. states she tolerated last tx well, no complaints. \\nChanges in Treatment Protocol: Decrease Excimer Laser dosing by 40% per Ruel Son 51 9/7/21.
Total Square Area In Cm2 (Required For Proper Billing- Whole Numbers Only Please): Tye
Fluence #1 (J/Cm2 Or Mj/Cm2): 1010 mj
Fluence Units: mJ/cm2
Spot Size: 3 x 3 cm
Treatment Number: [id number] [facility]

## 2021-10-05 ENCOUNTER — APPOINTMENT (RX ONLY)
Dept: URBAN - METROPOLITAN AREA CLINIC 116 | Facility: CLINIC | Age: 66
Setting detail: DERMATOLOGY
End: 2021-10-05

## 2021-10-05 DIAGNOSIS — L80 VITILIGO: ICD-10-CM

## 2021-10-05 PROCEDURE — ? EXCIMER LASER

## 2021-10-05 PROCEDURE — 96920 EXCIMER LSR PSRIASIS<250SQCM: CPT

## 2021-10-05 ASSESSMENT — LOCATION SIMPLE DESCRIPTION DERM
LOCATION SIMPLE: RIGHT MIDDLE FINGER
LOCATION SIMPLE: LEFT MIDDLE FINGER
LOCATION SIMPLE: RIGHT CHEEK

## 2021-10-05 ASSESSMENT — LOCATION ZONE DERM
LOCATION ZONE: FINGER
LOCATION ZONE: FACE

## 2021-10-05 NOTE — PROCEDURE: EXCIMER LASER
Consent: Written consent obtained, risks reviewed including but not limited to crusting, scabbing, blistering, scarring, darker or lighter pigmentary change, incidental hair removal, bruising, and/or incomplete removal.
Mode: tile
Post-Care Instructions: I reviewed with the patient in detail post-care instructions. Patient should stay away from the sun and wear sun protection until treated areas are fully healed.
Detail Level: Detailed
Location #1: face and fingers
Comments on Previous Treatment: Pt. states she tolerated last tx well, no complaints. \\nChanges in Treatment Protocol: Decrease Excimer Laser dosing by 40% per Eliz Son 51 9/7/21.
Total Square Area In Cm2 (Required For Proper Billing- Whole Numbers Only Please): Tye
Fluence #1 (J/Cm2 Or Mj/Cm2): 1220 mj
Fluence Units: mJ/cm2
Spot Size: 3 x 3 cm
Treatment Number: [id number] [facility]

## 2021-10-08 ENCOUNTER — APPOINTMENT (RX ONLY)
Dept: URBAN - METROPOLITAN AREA CLINIC 116 | Facility: CLINIC | Age: 66
Setting detail: DERMATOLOGY
End: 2021-10-08

## 2021-10-12 ENCOUNTER — APPOINTMENT (RX ONLY)
Dept: URBAN - METROPOLITAN AREA CLINIC 116 | Facility: CLINIC | Age: 66
Setting detail: DERMATOLOGY
End: 2021-10-12

## 2021-10-12 DIAGNOSIS — L80 VITILIGO: ICD-10-CM

## 2021-10-12 PROCEDURE — 96920 EXCIMER LSR PSRIASIS<250SQCM: CPT

## 2021-10-12 PROCEDURE — ? EXCIMER LASER

## 2021-10-12 ASSESSMENT — LOCATION ZONE DERM
LOCATION ZONE: FINGER
LOCATION ZONE: FACE

## 2021-10-12 NOTE — PROCEDURE: EXCIMER LASER
Consent: Written consent obtained, risks reviewed including but not limited to crusting, scabbing, blistering, scarring, darker or lighter pigmentary change, incidental hair removal, bruising, and/or incomplete removal.
Mode: tile
Post-Care Instructions: I reviewed with the patient in detail post-care instructions. Patient should stay away from the sun and wear sun protection until treated areas are fully healed.
Detail Level: Detailed
Location #1: face and fingers
Comments on Previous Treatment: Pt. states she tolerated last tx well, no complaints. \\nChanges in Treatment Protocol: Decrease Excimer Laser dosing by 40% per Uli Son 51 9/7/21.
Total Square Area In Cm2 (Required For Proper Billing- Whole Numbers Only Please): [address]
Fluence #1 (J/Cm2 Or Mj/Cm2): 1220 mj hold
Fluence Units: mJ/cm2
Spot Size: 3 x 3 cm
Treatment Number: 21

## 2021-10-14 ENCOUNTER — APPOINTMENT (RX ONLY)
Dept: URBAN - METROPOLITAN AREA CLINIC 116 | Facility: CLINIC | Age: 66
Setting detail: DERMATOLOGY
End: 2021-10-14

## 2021-10-19 ENCOUNTER — APPOINTMENT (RX ONLY)
Dept: URBAN - METROPOLITAN AREA CLINIC 116 | Facility: CLINIC | Age: 66
Setting detail: DERMATOLOGY
End: 2021-10-19

## 2021-10-19 DIAGNOSIS — L80 VITILIGO: ICD-10-CM | Status: IMPROVED

## 2021-10-19 DIAGNOSIS — L80 VITILIGO: ICD-10-CM

## 2021-10-19 PROCEDURE — ? PRESCRIPTION MEDICATION MANAGEMENT

## 2021-10-19 PROCEDURE — ? MEDICAL CONSULTATION: EXCIMER LASER

## 2021-10-19 PROCEDURE — 96920 EXCIMER LSR PSRIASIS<250SQCM: CPT

## 2021-10-19 PROCEDURE — 99214 OFFICE O/P EST MOD 30 MIN: CPT

## 2021-10-19 PROCEDURE — ? EXCIMER LASER

## 2021-10-19 PROCEDURE — ? COUNSELING

## 2021-10-19 ASSESSMENT — LOCATION SIMPLE DESCRIPTION DERM
LOCATION SIMPLE: LEFT LIP
LOCATION SIMPLE: LEFT RING FINGER
LOCATION SIMPLE: RIGHT MIDDLE FINGER
LOCATION SIMPLE: LEFT FOREHEAD
LOCATION SIMPLE: RIGHT SMALL FINGER
LOCATION SIMPLE: LEFT MIDDLE FINGER
LOCATION SIMPLE: RIGHT CHEEK
LOCATION SIMPLE: LEFT SMALL FINGER
LOCATION SIMPLE: RIGHT LIP
LOCATION SIMPLE: RIGHT EYEBROW
LOCATION SIMPLE: LEFT EYEBROW

## 2021-10-19 ASSESSMENT — LOCATION ZONE DERM
LOCATION ZONE: FINGER
LOCATION ZONE: LIP
LOCATION ZONE: FACE
LOCATION ZONE: FINGER
LOCATION ZONE: FACE

## 2021-10-19 ASSESSMENT — LOCATION DETAILED DESCRIPTION DERM
LOCATION DETAILED: LEFT SUPERIOR VERMILION LIP
LOCATION DETAILED: RIGHT INFERIOR CENTRAL MALAR CHEEK
LOCATION DETAILED: RIGHT UPPER CUTANEOUS LIP
LOCATION DETAILED: LEFT INFERIOR VERMILION LIP
LOCATION DETAILED: LEFT DISTAL DORSAL SMALL FINGER
LOCATION DETAILED: RIGHT MID DORSAL MIDDLE FINGER
LOCATION DETAILED: LEFT LATERAL EYEBROW
LOCATION DETAILED: LEFT MID DORSAL MIDDLE FINGER
LOCATION DETAILED: LEFT INFERIOR LATERAL FOREHEAD
LOCATION DETAILED: RIGHT MID DORSAL SMALL FINGER
LOCATION DETAILED: LEFT RING FINGER DISTAL INTERPHALANGEAL JOINT
LOCATION DETAILED: RIGHT LATERAL EYEBROW

## 2021-10-19 NOTE — PROCEDURE: EXCIMER LASER
Consent: Written consent obtained, risks reviewed including but not limited to crusting, scabbing, blistering, scarring, darker or lighter pigmentary change, incidental hair removal, bruising, and/or incomplete removal.
Mode: tile
Post-Care Instructions: I reviewed with the patient in detail post-care instructions. Patient should stay away from the sun and wear sun protection until treated areas are fully healed.
Detail Level: Detailed
Location #1: face and fingers
Comments on Previous Treatment: Pt. states she tolerated last tx well, no complaints. \\nChanges in Treatment Protocol: Decrease Excimer Laser dosing by 40% per Tuan Son 51 9/7/21.
Total Square Area In Cm2 (Required For Proper Billing- Whole Numbers Only Please): [address]
Fluence #1 (J/Cm2 Or Mj/Cm2): 1220 mj hold
Fluence Units: mJ/cm2
Spot Size: 3 x 3 cm
Treatment Number: 801 [facility]

## 2021-10-19 NOTE — PROCEDURE: PRESCRIPTION MEDICATION MANAGEMENT
Detail Level: Zone
Render In Strict Bullet Format?: No
Continue Regimen: Lidex ointment Daily Monday- Thursday at night\\nProtopic daily to lip\\nLaser treatment twice a week

## 2021-10-21 ENCOUNTER — RX ONLY (OUTPATIENT)
Age: 66
Setting detail: RX ONLY
End: 2021-10-21

## 2021-10-21 ENCOUNTER — APPOINTMENT (RX ONLY)
Dept: URBAN - METROPOLITAN AREA CLINIC 116 | Facility: CLINIC | Age: 66
Setting detail: DERMATOLOGY
End: 2021-10-21

## 2021-10-21 DIAGNOSIS — L80 VITILIGO: ICD-10-CM

## 2021-10-21 PROCEDURE — 96920 EXCIMER LSR PSRIASIS<250SQCM: CPT

## 2021-10-21 PROCEDURE — ? EXCIMER LASER

## 2021-10-21 RX ORDER — TRIAMCINOLONE ACETONIDE 1 MG/G
CREAM TOPICAL
Qty: 80 | Refills: 0 | Status: CANCELLED
Stop reason: CLARIF

## 2021-10-21 RX ORDER — TRIAMCINOLONE ACETONIDE 0.25 MG/G
CREAM TOPICAL
Qty: 80 | Refills: 0 | Status: ERX | COMMUNITY
Start: 2021-10-21

## 2021-10-21 ASSESSMENT — LOCATION ZONE DERM
LOCATION ZONE: FINGER
LOCATION ZONE: FACE

## 2021-10-21 NOTE — PROCEDURE: EXCIMER LASER
Consent: Written consent obtained, risks reviewed including but not limited to crusting, scabbing, blistering, scarring, darker or lighter pigmentary change, incidental hair removal, bruising, and/or incomplete removal.
Mode: tile
Post-Care Instructions: I reviewed with the patient in detail post-care instructions. Patient should stay away from the sun and wear sun protection until treated areas are fully healed.
Detail Level: Detailed
Location #1: face and fingers
Comments on Previous Treatment: Pt. states she tolerated last tx well, no complaints. \\nChanges in Treatment Protocol: Decrease Excimer Laser dosing by 40% per Kasandra Son 51 9/7/21.
Total Square Area In Cm2 (Required For Proper Billing- Whole Numbers Only Please): [address]
Fluence #1 (J/Cm2 Or Mj/Cm2): 1470  mj
Fluence Units: mJ/cm2
Spot Size: 3 x 3 cm
Treatment Number: 1700 [facility]

## 2021-10-25 ENCOUNTER — APPOINTMENT (RX ONLY)
Dept: URBAN - METROPOLITAN AREA CLINIC 116 | Facility: CLINIC | Age: 66
Setting detail: DERMATOLOGY
End: 2021-10-25

## 2021-10-25 DIAGNOSIS — L80 VITILIGO: ICD-10-CM

## 2021-10-25 PROCEDURE — ? EXCIMER LASER

## 2021-10-25 PROCEDURE — 96920 EXCIMER LSR PSRIASIS<250SQCM: CPT

## 2021-10-25 ASSESSMENT — LOCATION ZONE DERM
LOCATION ZONE: FINGER
LOCATION ZONE: FACE

## 2021-10-25 NOTE — PROCEDURE: EXCIMER LASER
Consent: Written consent obtained, risks reviewed including but not limited to crusting, scabbing, blistering, scarring, darker or lighter pigmentary change, incidental hair removal, bruising, and/or incomplete removal.
Mode: tile
Post-Care Instructions: I reviewed with the patient in detail post-care instructions. Patient should stay away from the sun and wear sun protection until treated areas are fully healed.
Detail Level: Detailed
Location #1: face and fingers
Comments on Previous Treatment: Pt. states she tolerated last tx well, no complaints. \\nChanges in Treatment Protocol: Decrease Excimer Laser dosing by 40% per Prince Son 51 9/7/21.
Total Square Area In Cm2 (Required For Proper Billing- Whole Numbers Only Please): [address]
Fluence #1 (J/Cm2 Or Mj/Cm2): 1470  mj hold today
Fluence Units: mJ/cm2
Spot Size: 3 x 3 cm
Treatment Number: 24

## 2021-10-27 ENCOUNTER — APPOINTMENT (RX ONLY)
Dept: URBAN - METROPOLITAN AREA CLINIC 116 | Facility: CLINIC | Age: 66
Setting detail: DERMATOLOGY
End: 2021-10-27

## 2021-10-27 DIAGNOSIS — L80 VITILIGO: ICD-10-CM

## 2021-10-27 PROCEDURE — ? EXCIMER LASER

## 2021-10-27 PROCEDURE — 96920 EXCIMER LSR PSRIASIS<250SQCM: CPT

## 2021-10-27 ASSESSMENT — LOCATION ZONE DERM
LOCATION ZONE: FINGER
LOCATION ZONE: FACE

## 2021-10-27 NOTE — PROCEDURE: EXCIMER LASER
Consent: Written consent obtained, risks reviewed including but not limited to crusting, scabbing, blistering, scarring, darker or lighter pigmentary change, incidental hair removal, bruising, and/or incomplete removal.
Mode: tile
Post-Care Instructions: I reviewed with the patient in detail post-care instructions. Patient should stay away from the sun and wear sun protection until treated areas are fully healed.
Detail Level: Detailed
Location #1: face and fingers
Comments on Previous Treatment: Pt. states she tolerated last tx well, no complaints. \\nChanges in Treatment Protocol: Decrease Excimer Laser dosing by 40% per Ilana Son 51 9/7/21.
Total Square Area In Cm2 (Required For Proper Billing- Whole Numbers Only Please): [id number]
Fluence #1 (J/Cm2 Or Mj/Cm2): 1470  mj hold today
Fluence Units: mJ/cm2
Spot Size: 3 x 3 cm
Treatment Number: 25

## 2021-11-02 ENCOUNTER — APPOINTMENT (RX ONLY)
Dept: URBAN - METROPOLITAN AREA CLINIC 116 | Facility: CLINIC | Age: 66
Setting detail: DERMATOLOGY
End: 2021-11-02

## 2021-11-02 DIAGNOSIS — L80 VITILIGO: ICD-10-CM

## 2021-11-02 PROCEDURE — ? EXCIMER LASER

## 2021-11-02 PROCEDURE — 96920 EXCIMER LSR PSRIASIS<250SQCM: CPT

## 2021-11-02 ASSESSMENT — LOCATION DETAILED DESCRIPTION DERM
LOCATION DETAILED: LEFT MID DORSAL MIDDLE FINGER
LOCATION DETAILED: RIGHT INFERIOR CENTRAL MALAR CHEEK
LOCATION DETAILED: RIGHT MID DORSAL MIDDLE FINGER

## 2021-11-02 ASSESSMENT — LOCATION ZONE DERM
LOCATION ZONE: FACE
LOCATION ZONE: FINGER

## 2021-11-02 NOTE — PROCEDURE: EXCIMER LASER
Consent: Written consent obtained, risks reviewed including but not limited to crusting, scabbing, blistering, scarring, darker or lighter pigmentary change, incidental hair removal, bruising, and/or incomplete removal.
Mode: tile
Post-Care Instructions: I reviewed with the patient in detail post-care instructions. Patient should stay away from the sun and wear sun protection until treated areas are fully healed.
Detail Level: Detailed
Location #1: face and fingers
Comments on Previous Treatment: Pt. states she tolerated last tx well, no complaints. \\nChanges in Treatment Protocol: Decrease Excimer Laser dosing by 40% per Katie Castro 51 9/7/21.
Total Square Area In Cm2 (Required For Proper Billing- Whole Numbers Only Please): [id number]
Fluence #1 (J/Cm2 Or Mj/Cm2): 1470  mj hold today
Fluence Units: mJ/cm2
Spot Size: 3 x 3 cm
Treatment Number: 3 hospitals

## 2021-11-04 ENCOUNTER — APPOINTMENT (RX ONLY)
Dept: URBAN - METROPOLITAN AREA CLINIC 116 | Facility: CLINIC | Age: 66
Setting detail: DERMATOLOGY
End: 2021-11-04

## 2021-11-04 DIAGNOSIS — L80 VITILIGO: ICD-10-CM

## 2021-11-04 PROCEDURE — ? EXCIMER LASER

## 2021-11-04 PROCEDURE — 96920 EXCIMER LSR PSRIASIS<250SQCM: CPT

## 2021-11-04 ASSESSMENT — LOCATION ZONE DERM
LOCATION ZONE: FINGER
LOCATION ZONE: FACE

## 2021-11-04 NOTE — PROCEDURE: EXCIMER LASER
Consent: Written consent obtained, risks reviewed including but not limited to crusting, scabbing, blistering, scarring, darker or lighter pigmentary change, incidental hair removal, bruising, and/or incomplete removal.
Mode: tile
Post-Care Instructions: I reviewed with the patient in detail post-care instructions. Patient should stay away from the sun and wear sun protection until treated areas are fully healed.
Detail Level: Detailed
Location #1: face and fingers
Comments on Previous Treatment: Pt. states she tolerated last tx well, no complaints. \\nChanges in Treatment Protocol: Decrease Excimer Laser dosing by 40% per Otilia Son 51 9/7/21.
Total Square Area In Cm2 (Required For Proper Billing- Whole Numbers Only Please): Prabha
Fluence #1 (J/Cm2 Or Mj/Cm2): 1770 mj
Fluence Units: mJ/cm2
Spot Size: 3 x 3 cm
Treatment Number: 1 [facility]

## 2021-11-10 ENCOUNTER — APPOINTMENT (RX ONLY)
Dept: URBAN - METROPOLITAN AREA CLINIC 116 | Facility: CLINIC | Age: 66
Setting detail: DERMATOLOGY
End: 2021-11-10

## 2021-11-10 DIAGNOSIS — L80 VITILIGO: ICD-10-CM

## 2021-11-10 PROCEDURE — ? EXCIMER LASER

## 2021-11-10 PROCEDURE — 96920 EXCIMER LSR PSRIASIS<250SQCM: CPT

## 2021-11-10 ASSESSMENT — LOCATION ZONE DERM
LOCATION ZONE: FINGER
LOCATION ZONE: FACE

## 2021-11-10 NOTE — PROCEDURE: EXCIMER LASER
Consent: Written consent obtained, risks reviewed including but not limited to crusting, scabbing, blistering, scarring, darker or lighter pigmentary change, incidental hair removal, bruising, and/or incomplete removal.
Mode: tile
Post-Care Instructions: I reviewed with the patient in detail post-care instructions. Patient should stay away from the sun and wear sun protection until treated areas are fully healed.
Detail Level: Detailed
Location #1: face and fingers
Comments on Previous Treatment: Pt. states she tolerated last tx well, no complaints. \\nChanges in Treatment Protocol: Decrease Excimer Laser dosing by 40% per Mitzy Son 51 9/7/21.
Total Square Area In Cm2 (Required For Proper Billing- Whole Numbers Only Please): [address]
Fluence #1 (J/Cm2 Or Mj/Cm2): 2130 mj
Fluence Units: mJ/cm2
Spot Size: 3 x 3 cm
Treatment Number: Nadine

## 2021-11-12 ENCOUNTER — APPOINTMENT (RX ONLY)
Dept: URBAN - METROPOLITAN AREA CLINIC 116 | Facility: CLINIC | Age: 66
Setting detail: DERMATOLOGY
End: 2021-11-12

## 2021-11-12 DIAGNOSIS — L80 VITILIGO: ICD-10-CM

## 2021-11-12 PROCEDURE — ? EXCIMER LASER

## 2021-11-12 PROCEDURE — 96920 EXCIMER LSR PSRIASIS<250SQCM: CPT

## 2021-11-12 ASSESSMENT — LOCATION ZONE DERM
LOCATION ZONE: FINGER
LOCATION ZONE: FACE

## 2021-11-12 NOTE — PROCEDURE: EXCIMER LASER
Consent: Written consent obtained, risks reviewed including but not limited to crusting, scabbing, blistering, scarring, darker or lighter pigmentary change, incidental hair removal, bruising, and/or incomplete removal.
Mode: tile
Post-Care Instructions: I reviewed with the patient in detail post-care instructions. Patient should stay away from the sun and wear sun protection until treated areas are fully healed.
Detail Level: Detailed
Location #1: face and fingers
Comments on Previous Treatment: Pt. states she tolerated last tx well, no complaints. \\nChanges in Treatment Protocol: Decrease Excimer Laser dosing by 40% per Wanda Rogers 9/7/21.
Total Square Area In Cm2 (Required For Proper Billing- Whole Numbers Only Please):  Samira Robertson
Fluence #1 (J/Cm2 Or Mj/Cm2): 2130 mj hold today
Fluence Units: mJ/cm2
Spot Size: 3 x 3 cm
Treatment Number: [id number] Reji Lanier

## 2021-11-15 ENCOUNTER — APPOINTMENT (RX ONLY)
Dept: URBAN - METROPOLITAN AREA CLINIC 116 | Facility: CLINIC | Age: 66
Setting detail: DERMATOLOGY
End: 2021-11-15

## 2021-11-15 DIAGNOSIS — L80 VITILIGO: ICD-10-CM

## 2021-11-15 PROCEDURE — ? EXCIMER LASER

## 2021-11-15 PROCEDURE — 96920 EXCIMER LSR PSRIASIS<250SQCM: CPT

## 2021-11-15 ASSESSMENT — LOCATION ZONE DERM
LOCATION ZONE: FACE
LOCATION ZONE: FINGER

## 2021-11-15 ASSESSMENT — LOCATION SIMPLE DESCRIPTION DERM
LOCATION SIMPLE: RIGHT CHEEK
LOCATION SIMPLE: LEFT MIDDLE FINGER
LOCATION SIMPLE: RIGHT MIDDLE FINGER

## 2021-11-15 NOTE — PROCEDURE: EXCIMER LASER
Comments on Previous Treatment: Pt. states she tolerated last tx well, no complaints. \\nChanges in Treatment Protocol: Decrease Excimer Laser dosing by 40% per Rossy Rogers 9/7/21.

## 2021-11-18 ENCOUNTER — APPOINTMENT (RX ONLY)
Dept: URBAN - METROPOLITAN AREA CLINIC 116 | Facility: CLINIC | Age: 66
Setting detail: DERMATOLOGY
End: 2021-11-18

## 2021-11-18 DIAGNOSIS — L80 VITILIGO: ICD-10-CM

## 2021-11-18 PROCEDURE — 96920 EXCIMER LSR PSRIASIS<250SQCM: CPT

## 2021-11-18 PROCEDURE — ? EXCIMER LASER

## 2021-11-18 ASSESSMENT — LOCATION ZONE DERM
LOCATION ZONE: FACE
LOCATION ZONE: FINGER

## 2021-11-18 NOTE — PROCEDURE: EXCIMER LASER
Consent: Written consent obtained, risks reviewed including but not limited to crusting, scabbing, blistering, scarring, darker or lighter pigmentary change, incidental hair removal, bruising, and/or incomplete removal.
Mode: tile
Post-Care Instructions: I reviewed with the patient in detail post-care instructions. Patient should stay away from the sun and wear sun protection until treated areas are fully healed.
Device Serial Number (Optional): [email or web address]
Detail Level: Detailed
Location #1: face and fingers
Total Square Area In Cm2 (Required For Proper Billing- Whole Numbers Only Please): Tye
Fluence #1 (J/Cm2 Or Mj/Cm2): 150 mj
Fluence Units: mJ/cm2
Spot Size: 2 x 2 cm
Treatment Number: 1
no

## 2021-11-18 NOTE — PROCEDURE: EXCIMER LASER
Consent: Written consent obtained, risks reviewed including but not limited to crusting, scabbing, blistering, scarring, darker or lighter pigmentary change, incidental hair removal, bruising, and/or incomplete removal.
Mode: tile
Post-Care Instructions: I reviewed with the patient in detail post-care instructions. Patient should stay away from the sun and wear sun protection until treated areas are fully healed.
Detail Level: Detailed
Location #1: face and fingers
Comments on Previous Treatment: Pt. states she tolerated last tx well, no complaints. \\nChanges in Treatment Protocol: Decrease Excimer Laser dosing by 40% per Wanda Rogers 9/7/21.
Total Square Area In Cm2 (Required For Proper Billing- Whole Numbers Only Please): Nadine
Fluence #1 (J/Cm2 Or Mj/Cm2): 2560 mj
Fluence Units: mJ/cm2
Spot Size: 3 x 3 cm
Treatment Number: [id number] [facility]

## 2021-11-30 ENCOUNTER — APPOINTMENT (RX ONLY)
Dept: URBAN - METROPOLITAN AREA CLINIC 116 | Facility: CLINIC | Age: 66
Setting detail: DERMATOLOGY
End: 2021-11-30

## 2021-11-30 DIAGNOSIS — L80 VITILIGO: ICD-10-CM

## 2021-11-30 PROCEDURE — ? EXCIMER LASER

## 2021-11-30 PROCEDURE — 96920 EXCIMER LSR PSRIASIS<250SQCM: CPT

## 2021-11-30 ASSESSMENT — LOCATION ZONE DERM
LOCATION ZONE: FINGER
LOCATION ZONE: FACE

## 2021-11-30 NOTE — PROCEDURE: EXCIMER LASER
Consent: Written consent obtained, risks reviewed including but not limited to crusting, scabbing, blistering, scarring, darker or lighter pigmentary change, incidental hair removal, bruising, and/or incomplete removal.
Mode: tile
Post-Care Instructions: I reviewed with the patient in detail post-care instructions. Patient should stay away from the sun and wear sun protection until treated areas are fully healed.
Detail Level: Detailed
Location #1: face and fingers
Comments on Previous Treatment: Pt. states she tolerated last tx well, no complaints. \\nChanges in Treatment Protocol: Decrease Excimer Laser dosing by 40% per Suraj Guppy with [address] 9/7/21.
Total Square Area In Cm2 (Required For Proper Billing- Whole Numbers Only Please): Nadine
Fluence #1 (J/Cm2 Or Mj/Cm2): 2060 mj
Fluence Units: mJ/cm2
Spot Size: 3 x 3 cm
Treatment Number: [id number] Cain Vargas

## 2021-12-02 ENCOUNTER — APPOINTMENT (RX ONLY)
Dept: URBAN - METROPOLITAN AREA CLINIC 116 | Facility: CLINIC | Age: 66
Setting detail: DERMATOLOGY
End: 2021-12-02

## 2021-12-02 DIAGNOSIS — L80 VITILIGO: ICD-10-CM

## 2021-12-02 PROCEDURE — 96920 EXCIMER LSR PSRIASIS<250SQCM: CPT

## 2021-12-02 PROCEDURE — ? EXCIMER LASER

## 2021-12-02 ASSESSMENT — LOCATION ZONE DERM
LOCATION ZONE: FACE
LOCATION ZONE: FINGER

## 2021-12-02 NOTE — PROCEDURE: EXCIMER LASER
Consent: Written consent obtained, risks reviewed including but not limited to crusting, scabbing, blistering, scarring, darker or lighter pigmentary change, incidental hair removal, bruising, and/or incomplete removal.
Mode: tile
Post-Care Instructions: I reviewed with the patient in detail post-care instructions. Patient should stay away from the sun and wear sun protection until treated areas are fully healed.
Detail Level: Detailed
Location #1: face and fingers
Comments on Previous Treatment: Pt. states she tolerated last tx well, no complaints. \\nChanges in Treatment Protocol: Decrease Excimer Laser dosing by 40% per Jennie Son 51 9/7/21.
Total Square Area In Cm2 (Required For Proper Billing- Whole Numbers Only Please): 170 [facility]
Fluence #1 (J/Cm2 Or Mj/Cm2): 2480 mj
Fluence Units: mJ/cm2
Spot Size: 3 x 3 cm
Treatment Number: 58 [facility]

## 2021-12-07 ENCOUNTER — APPOINTMENT (RX ONLY)
Dept: URBAN - METROPOLITAN AREA CLINIC 116 | Facility: CLINIC | Age: 66
Setting detail: DERMATOLOGY
End: 2021-12-07

## 2021-12-07 DIAGNOSIS — L80 VITILIGO: ICD-10-CM

## 2021-12-07 PROCEDURE — 96920 EXCIMER LSR PSRIASIS<250SQCM: CPT

## 2021-12-07 PROCEDURE — ? EXCIMER LASER

## 2021-12-07 ASSESSMENT — LOCATION ZONE DERM
LOCATION ZONE: FINGER
LOCATION ZONE: FACE

## 2021-12-07 NOTE — PROCEDURE: EXCIMER LASER
Consent: Written consent obtained, risks reviewed including but not limited to crusting, scabbing, blistering, scarring, darker or lighter pigmentary change, incidental hair removal, bruising, and/or incomplete removal.
Mode: tile
Post-Care Instructions: I reviewed with the patient in detail post-care instructions. Patient should stay away from the sun and wear sun protection until treated areas are fully healed.
Detail Level: Detailed
Location #1: face and fingers
Comments on Previous Treatment: Pt. states she tolerated last tx well, no complaints. \\nChanges in Treatment Protocol: Decrease Excimer Laser dosing by 40% per Hussein Son 51 9/7/21.
Total Square Area In Cm2 (Required For Proper Billing- Whole Numbers Only Please):  Samira Robertson
Fluence #1 (J/Cm2 Or Mj/Cm2): 2980  mj
Fluence Units: mJ/cm2
Spot Size: 3 x 3 cm
Treatment Number: [address] [facility]

## 2021-12-09 ENCOUNTER — APPOINTMENT (RX ONLY)
Dept: URBAN - METROPOLITAN AREA CLINIC 116 | Facility: CLINIC | Age: 66
Setting detail: DERMATOLOGY
End: 2021-12-09

## 2021-12-09 DIAGNOSIS — L80 VITILIGO: ICD-10-CM

## 2021-12-09 PROCEDURE — ? EXCIMER LASER

## 2021-12-09 PROCEDURE — 96920 EXCIMER LSR PSRIASIS<250SQCM: CPT

## 2021-12-09 ASSESSMENT — LOCATION ZONE DERM
LOCATION ZONE: FACE
LOCATION ZONE: FINGER

## 2021-12-09 NOTE — PROCEDURE: EXCIMER LASER
Consent: Written consent obtained, risks reviewed including but not limited to crusting, scabbing, blistering, scarring, darker or lighter pigmentary change, incidental hair removal, bruising, and/or incomplete removal.
Mode: tile
Post-Care Instructions: I reviewed with the patient in detail post-care instructions. Patient should stay away from the sun and wear sun protection until treated areas are fully healed.
Detail Level: Detailed
Location #1: face and fingers
Comments on Previous Treatment: Pt. states she tolerated last tx well, no complaints. \\nChanges in Treatment Protocol: Decrease Excimer Laser dosing by 40% per Мария Son 51 9/7/21.
Total Square Area In Cm2 (Required For Proper Billing- Whole Numbers Only Please):  Samira Robertson
Fluence #1 (J/Cm2 Or Mj/Cm2): 2980  mj
Fluence Units: mJ/cm2
Spot Size: 3 x 3 cm
Treatment Number: 36

## 2021-12-16 ENCOUNTER — APPOINTMENT (RX ONLY)
Dept: URBAN - METROPOLITAN AREA CLINIC 116 | Facility: CLINIC | Age: 66
Setting detail: DERMATOLOGY
End: 2021-12-16

## 2021-12-16 DIAGNOSIS — L80 VITILIGO: ICD-10-CM

## 2021-12-16 PROCEDURE — ? EXCIMER LASER

## 2021-12-16 PROCEDURE — 96920 EXCIMER LSR PSRIASIS<250SQCM: CPT

## 2021-12-16 ASSESSMENT — LOCATION ZONE DERM
LOCATION ZONE: FINGER
LOCATION ZONE: FACE

## 2021-12-16 NOTE — PROCEDURE: EXCIMER LASER
Consent: Written consent obtained, risks reviewed including but not limited to crusting, scabbing, blistering, scarring, darker or lighter pigmentary change, incidental hair removal, bruising, and/or incomplete removal.
Mode: tile
Post-Care Instructions: I reviewed with the patient in detail post-care instructions. Patient should stay away from the sun and wear sun protection until treated areas are fully healed.
Detail Level: Detailed
Location #1: face and fingers
Comments on Previous Treatment: Pt. states she tolerated last tx well, no complaints. \\nChanges in Treatment Protocol: Decrease Excimer Laser dosing by 40% per Priscilla Son 51 9/7/21.
Total Square Area In Cm2 (Required For Proper Billing- Whole Numbers Only Please):  Samira Robertson
Fluence #1 (J/Cm2 Or Mj/Cm2): 3580  mj
Fluence Units: mJ/cm2
Spot Size: 3 x 3 cm
Treatment Number: 36

## 2021-12-21 ENCOUNTER — APPOINTMENT (RX ONLY)
Dept: URBAN - METROPOLITAN AREA CLINIC 116 | Facility: CLINIC | Age: 66
Setting detail: DERMATOLOGY
End: 2021-12-21

## 2021-12-21 DIAGNOSIS — L80 VITILIGO: ICD-10-CM

## 2021-12-21 PROCEDURE — 96920 EXCIMER LSR PSRIASIS<250SQCM: CPT

## 2021-12-21 PROCEDURE — ? EXCIMER LASER

## 2021-12-21 ASSESSMENT — LOCATION ZONE DERM
LOCATION ZONE: FACE
LOCATION ZONE: FINGER

## 2021-12-21 NOTE — PROCEDURE: EXCIMER LASER
Consent: Written consent obtained, risks reviewed including but not limited to crusting, scabbing, blistering, scarring, darker or lighter pigmentary change, incidental hair removal, bruising, and/or incomplete removal.
Mode: tile
Post-Care Instructions: I reviewed with the patient in detail post-care instructions. Patient should stay away from the sun and wear sun protection until treated areas are fully healed.
Detail Level: Detailed
Location #1: face and fingers
Comments on Previous Treatment: Pt. states she tolerated last tx well, no complaints. \\nChanges in Treatment Protocol: Decrease Excimer Laser dosing by 40% per Мария Son 51 9/7/21.
Total Square Area In Cm2 (Required For Proper Billing- Whole Numbers Only Please):  Samira Robertson
Fluence #1 (J/Cm2 Or Mj/Cm2): 4300 mj
Fluence Units: mJ/cm2
Spot Size: 3 x 3 cm
Treatment Number: Morgan[place]leidy

## 2021-12-23 ENCOUNTER — APPOINTMENT (RX ONLY)
Dept: URBAN - METROPOLITAN AREA CLINIC 116 | Facility: CLINIC | Age: 66
Setting detail: DERMATOLOGY
End: 2021-12-23

## 2021-12-23 DIAGNOSIS — L80 VITILIGO: ICD-10-CM

## 2021-12-23 PROCEDURE — 96920 EXCIMER LSR PSRIASIS<250SQCM: CPT

## 2021-12-23 PROCEDURE — ? EXCIMER LASER

## 2021-12-23 ASSESSMENT — LOCATION ZONE DERM
LOCATION ZONE: FACE
LOCATION ZONE: FINGER

## 2021-12-23 NOTE — PROCEDURE: EXCIMER LASER
Consent: Written consent obtained, risks reviewed including but not limited to crusting, scabbing, blistering, scarring, darker or lighter pigmentary change, incidental hair removal, bruising, and/or incomplete removal.
Mode: tile
Post-Care Instructions: I reviewed with the patient in detail post-care instructions. Patient should stay away from the sun and wear sun protection until treated areas are fully healed.
Detail Level: Detailed
Location #1: face and fingers
Comments on Previous Treatment: Pt. states she tolerated last tx well, no complaints. \\nChanges in Treatment Protocol: Decrease Excimer Laser dosing by 40% per Prince Son 51 9/7/21.
Total Square Area In Cm2 (Required For Proper Billing- Whole Numbers Only Please): [address]
Fluence #1 (J/Cm2 Or Mj/Cm2): 5000 mj
Fluence Units: mJ/cm2
Spot Size: 3 x 3 cm
Treatment Number: [id number] [facility]

## 2022-01-04 ENCOUNTER — APPOINTMENT (RX ONLY)
Dept: URBAN - METROPOLITAN AREA CLINIC 116 | Facility: CLINIC | Age: 67
Setting detail: DERMATOLOGY
End: 2022-01-04

## 2022-01-04 DIAGNOSIS — L80 VITILIGO: ICD-10-CM

## 2022-01-04 PROCEDURE — 96920 EXCIMER LSR PSRIASIS<250SQCM: CPT

## 2022-01-04 PROCEDURE — ? EXCIMER LASER

## 2022-01-04 ASSESSMENT — LOCATION ZONE DERM
LOCATION ZONE: FINGER
LOCATION ZONE: FACE

## 2022-01-04 NOTE — PROCEDURE: EXCIMER LASER
Consent: Written consent obtained, risks reviewed including but not limited to crusting, scabbing, blistering, scarring, darker or lighter pigmentary change, incidental hair removal, bruising, and/or incomplete removal.
Mode: tile
Post-Care Instructions: I reviewed with the patient in detail post-care instructions. Patient should stay away from the sun and wear sun protection until treated areas are fully healed.
Detail Level: Detailed
Location #1: face and fingers
Comments on Previous Treatment: Pt. states she tolerated last tx well, no complaints. \\nChanges in Treatment Protocol: Decrease Excimer Laser dosing by 40% per Cass Luciano with [address] 9/7/21.
Total Square Area In Cm2 (Required For Proper Billing- Whole Numbers Only Please): [address]
Fluence #1 (J/Cm2 Or Mj/Cm2): 5000 mj
Fluence Units: mJ/cm2
Spot Size: 3 x 3 cm
Treatment Number: [id number] Mathew Mandel

## 2022-01-06 ENCOUNTER — APPOINTMENT (RX ONLY)
Dept: URBAN - METROPOLITAN AREA CLINIC 116 | Facility: CLINIC | Age: 67
Setting detail: DERMATOLOGY
End: 2022-01-06

## 2022-01-06 DIAGNOSIS — L80 VITILIGO: ICD-10-CM

## 2022-01-06 PROCEDURE — ? EXCIMER LASER

## 2022-01-06 PROCEDURE — 96920 EXCIMER LSR PSRIASIS<250SQCM: CPT

## 2022-01-06 ASSESSMENT — LOCATION ZONE DERM
LOCATION ZONE: FACE
LOCATION ZONE: FINGER

## 2022-01-06 NOTE — PROCEDURE: EXCIMER LASER
Consent: Written consent obtained, risks reviewed including but not limited to crusting, scabbing, blistering, scarring, darker or lighter pigmentary change, incidental hair removal, bruising, and/or incomplete removal.
Mode: tile
Post-Care Instructions: I reviewed with the patient in detail post-care instructions. Patient should stay away from the sun and wear sun protection until treated areas are fully healed.
Detail Level: Detailed
Location #1: face and fingers
Comments on Previous Treatment: Pt. states she tolerated last tx well, no complaints. \\nChanges in Treatment Protocol: Decrease Excimer Laser dosing by 40% per Rossy Rogers 9/7/21.
Total Square Area In Cm2 (Required For Proper Billing- Whole Numbers Only Please): [address]
Fluence #1 (J/Cm2 Or Mj/Cm2): 5000 mj
Fluence Units: mJ/cm2
Spot Size: 3 x 3 cm
Treatment Number: [address]

## 2022-01-11 ENCOUNTER — APPOINTMENT (RX ONLY)
Dept: URBAN - METROPOLITAN AREA CLINIC 116 | Facility: CLINIC | Age: 67
Setting detail: DERMATOLOGY
End: 2022-01-11

## 2022-01-11 DIAGNOSIS — L80 VITILIGO: ICD-10-CM

## 2022-01-11 PROCEDURE — 96920 EXCIMER LSR PSRIASIS<250SQCM: CPT

## 2022-01-11 PROCEDURE — ? EXCIMER LASER

## 2022-01-11 ASSESSMENT — LOCATION ZONE DERM
LOCATION ZONE: FACE
LOCATION ZONE: FINGER

## 2022-01-11 NOTE — PROCEDURE: EXCIMER LASER
Consent: Written consent obtained, risks reviewed including but not limited to crusting, scabbing, blistering, scarring, darker or lighter pigmentary change, incidental hair removal, bruising, and/or incomplete removal.
Mode: tile
Post-Care Instructions: I reviewed with the patient in detail post-care instructions. Patient should stay away from the sun and wear sun protection until treated areas are fully healed.
Detail Level: Detailed
Location #1: face and fingers
Comments on Previous Treatment: Pt. states she tolerated last tx well, no complaints. \\nChanges in Treatment Protocol: Decrease Excimer Laser dosing by 40% per Tuan Son 51 9/7/21.
Total Square Area In Cm2 (Required For Proper Billing- Whole Numbers Only Please): [phone]
Fluence #1 (J/Cm2 Or Mj/Cm2): 5000 mj
Fluence Units: mJ/cm2
Spot Size: 3 x 3 cm
Treatment Number: 200 [facility]

## 2022-01-13 ENCOUNTER — APPOINTMENT (RX ONLY)
Dept: URBAN - METROPOLITAN AREA CLINIC 116 | Facility: CLINIC | Age: 67
Setting detail: DERMATOLOGY
End: 2022-01-13

## 2022-01-13 DIAGNOSIS — L80 VITILIGO: ICD-10-CM

## 2022-01-13 PROCEDURE — ? EXCIMER LASER

## 2022-01-13 PROCEDURE — 96920 EXCIMER LSR PSRIASIS<250SQCM: CPT

## 2022-01-13 ASSESSMENT — LOCATION ZONE DERM
LOCATION ZONE: FACE
LOCATION ZONE: FINGER

## 2022-01-13 NOTE — PROCEDURE: EXCIMER LASER
Consent: Written consent obtained, risks reviewed including but not limited to crusting, scabbing, blistering, scarring, darker or lighter pigmentary change, incidental hair removal, bruising, and/or incomplete removal.
Mode: tile
Post-Care Instructions: I reviewed with the patient in detail post-care instructions. Patient should stay away from the sun and wear sun protection until treated areas are fully healed.
Detail Level: Detailed
Location #1: face and fingers
Comments on Previous Treatment: Pt. states she tolerated last tx well, no complaints. \\nChanges in Treatment Protocol: Decrease Excimer Laser dosing by 40% per Cass Luciano with [address] 9/7/21.
Total Square Area In Cm2 (Required For Proper Billing- Whole Numbers Only Please): [address]
Fluence #1 (J/Cm2 Or Mj/Cm2): 5000 mj
Fluence Units: mJ/cm2
Spot Size: 3 x 3 cm
Treatment Number: 42

## 2022-01-18 ENCOUNTER — APPOINTMENT (RX ONLY)
Dept: URBAN - METROPOLITAN AREA CLINIC 116 | Facility: CLINIC | Age: 67
Setting detail: DERMATOLOGY
End: 2022-01-18

## 2022-01-18 DIAGNOSIS — L80 VITILIGO: ICD-10-CM

## 2022-01-18 PROCEDURE — ? EXCIMER LASER

## 2022-01-18 PROCEDURE — 96920 EXCIMER LSR PSRIASIS<250SQCM: CPT

## 2022-01-18 ASSESSMENT — LOCATION ZONE DERM
LOCATION ZONE: FACE
LOCATION ZONE: FINGER

## 2022-01-18 NOTE — PROCEDURE: EXCIMER LASER
Consent: Written consent obtained, risks reviewed including but not limited to crusting, scabbing, blistering, scarring, darker or lighter pigmentary change, incidental hair removal, bruising, and/or incomplete removal.
Mode: tile
Post-Care Instructions: I reviewed with the patient in detail post-care instructions. Patient should stay away from the sun and wear sun protection until treated areas are fully healed.
Detail Level: Detailed
Location #1: face and fingers
Comments on Previous Treatment: Pt. states she tolerated last tx well, no complaints. \\nChanges in Treatment Protocol: Decrease Excimer Laser dosing by 40% per Edil Guadalajara with [address] 9/7/21.
Total Square Area In Cm2 (Required For Proper Billing- Whole Numbers Only Please): [address]
Fluence #1 (J/Cm2 Or Mj/Cm2): 5000 mj
Fluence Units: mJ/cm2
Spot Size: 3 x 3 cm
Treatment Number: Allika 46

## 2022-01-20 ENCOUNTER — RX ONLY (OUTPATIENT)
Age: 67
Setting detail: RX ONLY
End: 2022-01-20

## 2022-01-20 ENCOUNTER — APPOINTMENT (RX ONLY)
Dept: URBAN - METROPOLITAN AREA CLINIC 116 | Facility: CLINIC | Age: 67
Setting detail: DERMATOLOGY
End: 2022-01-20

## 2022-01-20 DIAGNOSIS — L80 VITILIGO: ICD-10-CM

## 2022-01-20 DIAGNOSIS — L80 VITILIGO: ICD-10-CM | Status: IMPROVED

## 2022-01-20 PROCEDURE — ? MEDICAL CONSULTATION: EXCIMER LASER

## 2022-01-20 PROCEDURE — 96920 EXCIMER LSR PSRIASIS<250SQCM: CPT

## 2022-01-20 PROCEDURE — ? PRESCRIPTION MEDICATION MANAGEMENT

## 2022-01-20 PROCEDURE — ? COUNSELING

## 2022-01-20 PROCEDURE — 99214 OFFICE O/P EST MOD 30 MIN: CPT

## 2022-01-20 PROCEDURE — ? EXCIMER LASER

## 2022-01-20 RX ORDER — KETOCONAZOLE 20 MG/G
CREAM TOPICAL
Qty: 30 | Refills: 3 | Status: ERX | COMMUNITY
Start: 2022-01-20

## 2022-01-20 RX ORDER — TRIAMCINOLONE ACETONIDE 0.25 MG/G
CREAM TOPICAL
Qty: 80 | Refills: 2 | Status: ERX

## 2022-01-20 ASSESSMENT — LOCATION DETAILED DESCRIPTION DERM
LOCATION DETAILED: LEFT LATERAL EYEBROW
LOCATION DETAILED: RIGHT LATERAL EYEBROW
LOCATION DETAILED: LEFT RING FINGER DISTAL INTERPHALANGEAL JOINT
LOCATION DETAILED: LEFT MID DORSAL MIDDLE FINGER
LOCATION DETAILED: LEFT SUPERIOR VERMILION LIP
LOCATION DETAILED: LEFT DISTAL DORSAL SMALL FINGER
LOCATION DETAILED: RIGHT MID DORSAL MIDDLE FINGER
LOCATION DETAILED: RIGHT UPPER CUTANEOUS LIP
LOCATION DETAILED: RIGHT INFERIOR CENTRAL MALAR CHEEK
LOCATION DETAILED: RIGHT MID DORSAL SMALL FINGER
LOCATION DETAILED: LEFT INFERIOR VERMILION LIP
LOCATION DETAILED: LEFT INFERIOR LATERAL FOREHEAD

## 2022-01-20 ASSESSMENT — LOCATION SIMPLE DESCRIPTION DERM
LOCATION SIMPLE: RIGHT MIDDLE FINGER
LOCATION SIMPLE: LEFT EYEBROW
LOCATION SIMPLE: LEFT FOREHEAD
LOCATION SIMPLE: RIGHT EYEBROW
LOCATION SIMPLE: LEFT RING FINGER
LOCATION SIMPLE: LEFT SMALL FINGER
LOCATION SIMPLE: RIGHT SMALL FINGER
LOCATION SIMPLE: RIGHT CHEEK
LOCATION SIMPLE: LEFT MIDDLE FINGER
LOCATION SIMPLE: RIGHT LIP
LOCATION SIMPLE: LEFT LIP

## 2022-01-20 ASSESSMENT — LOCATION ZONE DERM
LOCATION ZONE: LIP
LOCATION ZONE: FACE
LOCATION ZONE: FINGER
LOCATION ZONE: FACE
LOCATION ZONE: FINGER

## 2022-01-20 NOTE — PROCEDURE: EXCIMER LASER
Consent: Written consent obtained, risks reviewed including but not limited to crusting, scabbing, blistering, scarring, darker or lighter pigmentary change, incidental hair removal, bruising, and/or incomplete removal.
Mode: tile
Post-Care Instructions: I reviewed with the patient in detail post-care instructions. Patient should stay away from the sun and wear sun protection until treated areas are fully healed.
Detail Level: Detailed
Location #1: face and fingers
Comments on Previous Treatment: Pt. states she tolerated last tx well, no complaints. \\nChanges in Treatment Protocol: Decrease Excimer Laser dosing by 40% per Eliz Son 51 9/7/21.
Total Square Area In Cm2 (Required For Proper Billing- Whole Numbers Only Please): 5644 [facility]
Fluence #1 (J/Cm2 Or Mj/Cm2): 5000 mj
Fluence Units: mJ/cm2
Spot Size: 3 x 3 cm
Treatment Number: 237 [facility]

## 2022-01-25 ENCOUNTER — APPOINTMENT (RX ONLY)
Dept: URBAN - METROPOLITAN AREA CLINIC 116 | Facility: CLINIC | Age: 67
Setting detail: DERMATOLOGY
End: 2022-01-25

## 2022-01-25 DIAGNOSIS — L80 VITILIGO: ICD-10-CM

## 2022-01-25 PROCEDURE — ? EXCIMER LASER

## 2022-01-25 PROCEDURE — 96920 EXCIMER LSR PSRIASIS<250SQCM: CPT

## 2022-01-25 ASSESSMENT — LOCATION ZONE DERM
LOCATION ZONE: FINGER
LOCATION ZONE: FACE

## 2022-01-25 NOTE — PROCEDURE: EXCIMER LASER
Consent: Written consent obtained, risks reviewed including but not limited to crusting, scabbing, blistering, scarring, darker or lighter pigmentary change, incidental hair removal, bruising, and/or incomplete removal.
Mode: tile
Post-Care Instructions: I reviewed with the patient in detail post-care instructions. Patient should stay away from the sun and wear sun protection until treated areas are fully healed.
Detail Level: Detailed
Location #1: face and fingers
Comments on Previous Treatment: Pt. states she tolerated last tx well, no complaints. \\nChanges in Treatment Protocol: Decrease Excimer Laser dosing by 40% per Kasandra Son 51 9/7/21.
Total Square Area In Cm2 (Required For Proper Billing- Whole Numbers Only Please): 0592 [facility]
Fluence #1 (J/Cm2 Or Mj/Cm2): 5000 mj
Fluence Units: mJ/cm2
Spot Size: 3 x 3 cm
Treatment Number: [address]

## 2022-01-27 ENCOUNTER — APPOINTMENT (RX ONLY)
Dept: URBAN - METROPOLITAN AREA CLINIC 116 | Facility: CLINIC | Age: 67
Setting detail: DERMATOLOGY
End: 2022-01-27

## 2022-01-27 DIAGNOSIS — L80 VITILIGO: ICD-10-CM

## 2022-01-27 PROCEDURE — ? EXCIMER LASER

## 2022-01-27 PROCEDURE — 96920 EXCIMER LSR PSRIASIS<250SQCM: CPT

## 2022-01-27 ASSESSMENT — LOCATION ZONE DERM
LOCATION ZONE: FINGER
LOCATION ZONE: FACE

## 2022-01-27 NOTE — PROCEDURE: EXCIMER LASER
Consent: Written consent obtained, risks reviewed including but not limited to crusting, scabbing, blistering, scarring, darker or lighter pigmentary change, incidental hair removal, bruising, and/or incomplete removal.
Mode: tile
Post-Care Instructions: I reviewed with the patient in detail post-care instructions. Patient should stay away from the sun and wear sun protection until treated areas are fully healed.
Detail Level: Detailed
Location #1: face and fingers
Comments on Previous Treatment: Pt. states she tolerated last tx well, no complaints. \\nChanges in Treatment Protocol: Decrease Excimer Laser dosing by 40% per Prince Son 51 9/7/21.
Total Square Area In Cm2 (Required For Proper Billing- Whole Numbers Only Please): [address] [facility]
Fluence #1 (J/Cm2 Or Mj/Cm2): 5000 mj
Fluence Units: mJ/cm2
Spot Size: 3 x 3 cm
Treatment Number: [address]

## 2022-02-01 ENCOUNTER — APPOINTMENT (RX ONLY)
Dept: URBAN - METROPOLITAN AREA CLINIC 116 | Facility: CLINIC | Age: 67
Setting detail: DERMATOLOGY
End: 2022-02-01

## 2022-02-01 DIAGNOSIS — L80 VITILIGO: ICD-10-CM

## 2022-02-01 PROCEDURE — ? EXCIMER LASER

## 2022-02-01 PROCEDURE — 96920 EXCIMER LSR PSRIASIS<250SQCM: CPT

## 2022-02-01 ASSESSMENT — LOCATION ZONE DERM
LOCATION ZONE: FACE
LOCATION ZONE: FINGER

## 2022-02-01 NOTE — PROCEDURE: EXCIMER LASER
Consent: Written consent obtained, risks reviewed including but not limited to crusting, scabbing, blistering, scarring, darker or lighter pigmentary change, incidental hair removal, bruising, and/or incomplete removal.
Mode: tile
Post-Care Instructions: I reviewed with the patient in detail post-care instructions. Patient should stay away from the sun and wear sun protection until treated areas are fully healed.
Detail Level: Detailed
Location #1: face and fingers
Comments on Previous Treatment: Pt. states she tolerated last tx well, no complaints. \\nChanges in Treatment Protocol: Decrease Excimer Laser dosing by 40% per Justa Son 51 9/7/21.
Total Square Area In Cm2 (Required For Proper Billing- Whole Numbers Only Please): 24
Fluence #1 (J/Cm2 Or Mj/Cm2): 5000 mj
Fluence Units: mJ/cm2
Spot Size: 3 x 3 cm
Treatment Number: [address]

## 2022-02-03 ENCOUNTER — APPOINTMENT (RX ONLY)
Dept: URBAN - METROPOLITAN AREA CLINIC 116 | Facility: CLINIC | Age: 67
Setting detail: DERMATOLOGY
End: 2022-02-03

## 2022-02-03 DIAGNOSIS — L80 VITILIGO: ICD-10-CM

## 2022-02-03 PROCEDURE — ? EXCIMER LASER

## 2022-02-03 PROCEDURE — 96920 EXCIMER LSR PSRIASIS<250SQCM: CPT

## 2022-02-03 ASSESSMENT — LOCATION ZONE DERM
LOCATION ZONE: FACE
LOCATION ZONE: FINGER

## 2022-02-03 NOTE — PROCEDURE: EXCIMER LASER
Consent: Written consent obtained, risks reviewed including but not limited to crusting, scabbing, blistering, scarring, darker or lighter pigmentary change, incidental hair removal, bruising, and/or incomplete removal.
Mode: tile
Post-Care Instructions: I reviewed with the patient in detail post-care instructions. Patient should stay away from the sun and wear sun protection until treated areas are fully healed.
Detail Level: Detailed
Location #1: face and fingers
Comments on Previous Treatment: Pt. states she tolerated last tx well, no complaints. \\nChanges in Treatment Protocol: Decrease Excimer Laser dosing by 40% per Edil Guadalajara with [address] 9/7/21.
Total Square Area In Cm2 (Required For Proper Billing- Whole Numbers Only Please): 24
Fluence #1 (J/Cm2 Or Mj/Cm2): 5000 mj
Fluence Units: mJ/cm2
Spot Size: 3 x 3 cm
Treatment Number: 6101 [facility]

## 2022-02-08 ENCOUNTER — APPOINTMENT (RX ONLY)
Dept: URBAN - METROPOLITAN AREA CLINIC 116 | Facility: CLINIC | Age: 67
Setting detail: DERMATOLOGY
End: 2022-02-08

## 2022-02-08 DIAGNOSIS — L80 VITILIGO: ICD-10-CM

## 2022-02-08 PROCEDURE — ? EXCIMER LASER

## 2022-02-08 PROCEDURE — 96920 EXCIMER LSR PSRIASIS<250SQCM: CPT

## 2022-02-08 ASSESSMENT — LOCATION ZONE DERM
LOCATION ZONE: FINGER
LOCATION ZONE: FACE

## 2022-02-08 NOTE — PROCEDURE: EXCIMER LASER
Consent: Written consent obtained, risks reviewed including but not limited to crusting, scabbing, blistering, scarring, darker or lighter pigmentary change, incidental hair removal, bruising, and/or incomplete removal.
Mode: tile
Post-Care Instructions: I reviewed with the patient in detail post-care instructions. Patient should stay away from the sun and wear sun protection until treated areas are fully healed.
Detail Level: Detailed
Location #1: face and fingers
Comments on Previous Treatment: Pt. states she tolerated last tx well, no complaints. \\nChanges in Treatment Protocol: Decrease Excimer Laser dosing by 40% per Ruel Son 51 9/7/21.
Total Square Area In Cm2 (Required For Proper Billing- Whole Numbers Only Please): 21
Fluence #1 (J/Cm2 Or Mj/Cm2): 5000 mj
Fluence Units: mJ/cm2
Spot Size: 3 x 3 cm
Treatment Number: 6101 [facility]

## 2022-02-10 ENCOUNTER — APPOINTMENT (RX ONLY)
Dept: URBAN - METROPOLITAN AREA CLINIC 116 | Facility: CLINIC | Age: 67
Setting detail: DERMATOLOGY
End: 2022-02-10

## 2022-02-10 DIAGNOSIS — L80 VITILIGO: ICD-10-CM

## 2022-02-10 PROCEDURE — 96920 EXCIMER LSR PSRIASIS<250SQCM: CPT

## 2022-02-10 PROCEDURE — ? EXCIMER LASER

## 2022-02-10 ASSESSMENT — LOCATION ZONE DERM
LOCATION ZONE: FINGER
LOCATION ZONE: FACE

## 2022-02-18 ENCOUNTER — APPOINTMENT (RX ONLY)
Dept: URBAN - METROPOLITAN AREA CLINIC 116 | Facility: CLINIC | Age: 67
Setting detail: DERMATOLOGY
End: 2022-02-18

## 2022-02-18 DIAGNOSIS — L80 VITILIGO: ICD-10-CM

## 2022-02-18 PROCEDURE — ? EXCIMER LASER

## 2022-02-18 PROCEDURE — 96920 EXCIMER LSR PSRIASIS<250SQCM: CPT

## 2022-02-18 ASSESSMENT — LOCATION ZONE DERM
LOCATION ZONE: FACE
LOCATION ZONE: FINGER

## 2022-02-18 NOTE — PROCEDURE: EXCIMER LASER
Consent: Written consent obtained, risks reviewed including but not limited to crusting, scabbing, blistering, scarring, darker or lighter pigmentary change, incidental hair removal, bruising, and/or incomplete removal.
Mode: tile
Post-Care Instructions: I reviewed with the patient in detail post-care instructions. Patient should stay away from the sun and wear sun protection until treated areas are fully healed.
Detail Level: Detailed
Location #1: face and fingers
Comments on Previous Treatment: Pt. states she tolerated last tx well, no complaints. \\nChanges in Treatment Protocol: Decrease Excimer Laser dosing by 40% per Mitzy Son 51 9/7/21.
Total Square Area In Cm2 (Required For Proper Billing- Whole Numbers Only Please): [address]
Fluence #1 (J/Cm2 Or Mj/Cm2): 2000 mj reduce today .
Fluence Units: mJ/cm2
Spot Size: 3 x 3 cm
Treatment Number: [address]

## 2022-02-23 ENCOUNTER — APPOINTMENT (RX ONLY)
Dept: URBAN - METROPOLITAN AREA CLINIC 116 | Facility: CLINIC | Age: 67
Setting detail: DERMATOLOGY
End: 2022-02-23

## 2022-02-24 NOTE — PROCEDURE: EXCIMER LASER
Consent: Written consent obtained, risks reviewed including but not limited to crusting, scabbing, blistering, scarring, darker or lighter pigmentary change, incidental hair removal, bruising, and/or incomplete removal.
Mode: tile
Post-Care Instructions: I reviewed with the patient in detail post-care instructions. Patient should stay away from the sun and wear sun protection until treated areas are fully healed.
Detail Level: Detailed
Location #1: face and fingers
Comments on Previous Treatment: Pt. states she tolerated last tx well, no complaints. \\nChanges in Treatment Protocol: Decrease Excimer Laser dosing by 40% per Marcha Cadet with [address] 9/7/21.
Total Square Area In Cm2 (Required For Proper Billing- Whole Numbers Only Please): 21
Fluence #1 (J/Cm2 Or Mj/Cm2): 3000 mj reduce today .
Fluence Units: mJ/cm2
Spot Size: 3 x 3 cm
Treatment Number: 6101 [facility]
declines

## 2022-03-01 ENCOUNTER — APPOINTMENT (RX ONLY)
Dept: URBAN - METROPOLITAN AREA CLINIC 116 | Facility: CLINIC | Age: 67
Setting detail: DERMATOLOGY
End: 2022-03-01

## 2022-03-01 DIAGNOSIS — L80 VITILIGO: ICD-10-CM

## 2022-03-01 PROCEDURE — ? EXCIMER LASER

## 2022-03-01 PROCEDURE — 96920 EXCIMER LSR PSRIASIS<250SQCM: CPT

## 2022-03-01 ASSESSMENT — LOCATION ZONE DERM
LOCATION ZONE: FACE
LOCATION ZONE: FINGER

## 2022-03-01 NOTE — PROCEDURE: EXCIMER LASER
Consent: Written consent obtained, risks reviewed including but not limited to crusting, scabbing, blistering, scarring, darker or lighter pigmentary change, incidental hair removal, bruising, and/or incomplete removal.
Mode: tile
Post-Care Instructions: I reviewed with the patient in detail post-care instructions. Patient should stay away from the sun and wear sun protection until treated areas are fully healed.
Detail Level: Detailed
Location #1: face and fingers
Comments on Previous Treatment: Pt. states she tolerated last tx well, no complaints. \\nChanges in Treatment Protocol: Decrease Excimer Laser dosing by 40% per Hussein Son 51 9/7/21.
Total Square Area In Cm2 (Required For Proper Billing- Whole Numbers Only Please): [address]
Fluence #1 (J/Cm2 Or Mj/Cm2): 1000 mj decrease today
Fluence Units: mJ/cm2
Spot Size: 3 x 3 cm
Treatment Number: 1000 [facility]

## 2022-03-03 ENCOUNTER — APPOINTMENT (RX ONLY)
Dept: URBAN - METROPOLITAN AREA CLINIC 116 | Facility: CLINIC | Age: 67
Setting detail: DERMATOLOGY
End: 2022-03-03

## 2022-03-03 DIAGNOSIS — L80 VITILIGO: ICD-10-CM

## 2022-03-03 PROCEDURE — ? EXCIMER LASER

## 2022-03-03 PROCEDURE — 96920 EXCIMER LSR PSRIASIS<250SQCM: CPT

## 2022-03-03 ASSESSMENT — LOCATION ZONE DERM
LOCATION ZONE: FACE
LOCATION ZONE: FINGER

## 2022-03-03 NOTE — PROCEDURE: EXCIMER LASER
Consent: Written consent obtained, risks reviewed including but not limited to crusting, scabbing, blistering, scarring, darker or lighter pigmentary change, incidental hair removal, bruising, and/or incomplete removal.
Mode: tile
Post-Care Instructions: I reviewed with the patient in detail post-care instructions. Patient should stay away from the sun and wear sun protection until treated areas are fully healed.
Detail Level: Detailed
Location #1: face and fingers
Comments on Previous Treatment: Pt. states she tolerated last tx well, no complaints. \\nChanges in Treatment Protocol: Decrease Excimer Laser dosing by 40% per Brayan Son 51 9/7/21.
Total Square Area In Cm2 (Required For Proper Billing- Whole Numbers Only Please): Prabha
Fluence #1 (J/Cm2 Or Mj/Cm2): 1000 mj  hold
Fluence Units: mJ/cm2
Spot Size: 3 x 3 cm
Treatment Number: [facility]

## 2022-03-08 ENCOUNTER — APPOINTMENT (RX ONLY)
Dept: URBAN - METROPOLITAN AREA CLINIC 116 | Facility: CLINIC | Age: 67
Setting detail: DERMATOLOGY
End: 2022-03-08

## 2022-03-08 DIAGNOSIS — L80 VITILIGO: ICD-10-CM

## 2022-03-08 PROCEDURE — 96920 EXCIMER LSR PSRIASIS<250SQCM: CPT

## 2022-03-08 PROCEDURE — ? EXCIMER LASER

## 2022-03-08 ASSESSMENT — LOCATION ZONE DERM
LOCATION ZONE: FINGER
LOCATION ZONE: FACE

## 2022-03-08 NOTE — PROCEDURE: EXCIMER LASER
Consent: Written consent obtained, risks reviewed including but not limited to crusting, scabbing, blistering, scarring, darker or lighter pigmentary change, incidental hair removal, bruising, and/or incomplete removal.
Mode: tile
Post-Care Instructions: I reviewed with the patient in detail post-care instructions. Patient should stay away from the sun and wear sun protection until treated areas are fully healed.
Detail Level: Detailed
Location #1: face and fingers
Comments on Previous Treatment: Pt. states she tolerated last tx well, no complaints. \\nChanges in Treatment Protocol: Decrease Excimer Laser dosing by 40% per Eliz Son 51 9/7/21.
Total Square Area In Cm2 (Required For Proper Billing- Whole Numbers Only Please): [address]
Fluence #1 (J/Cm2 Or Mj/Cm2): 1000 mj  hold
Fluence Units: mJ/cm2
Spot Size: 3 x 3 cm
Treatment Number: 400 [facility]

## 2022-03-10 ENCOUNTER — APPOINTMENT (RX ONLY)
Dept: URBAN - METROPOLITAN AREA CLINIC 116 | Facility: CLINIC | Age: 67
Setting detail: DERMATOLOGY
End: 2022-03-10

## 2022-03-10 DIAGNOSIS — L80 VITILIGO: ICD-10-CM

## 2022-03-10 PROCEDURE — 96920 EXCIMER LSR PSRIASIS<250SQCM: CPT

## 2022-03-10 PROCEDURE — ? EXCIMER LASER

## 2022-03-10 ASSESSMENT — LOCATION ZONE DERM
LOCATION ZONE: FACE
LOCATION ZONE: FINGER

## 2022-03-10 NOTE — PROCEDURE: EXCIMER LASER
Consent: Written consent obtained, risks reviewed including but not limited to crusting, scabbing, blistering, scarring, darker or lighter pigmentary change, incidental hair removal, bruising, and/or incomplete removal.
Mode: tile
Post-Care Instructions: I reviewed with the patient in detail post-care instructions. Patient should stay away from the sun and wear sun protection until treated areas are fully healed.
Detail Level: Detailed
Location #1: face and fingers
Comments on Previous Treatment: Pt. states she tolerated last tx well, no complaints. \\nChanges in Treatment Protocol: Decrease Excimer Laser dosing by 40% per Angie Son 51 9/7/21.
Total Square Area In Cm2 (Required For Proper Billing- Whole Numbers Only Please): [address]
Fluence #1 (J/Cm2 Or Mj/Cm2): 1000 mj  hold
Fluence Units: mJ/cm2
Spot Size: 3 x 3 cm
Treatment Number: [id number]

## 2022-03-17 ENCOUNTER — APPOINTMENT (RX ONLY)
Dept: URBAN - METROPOLITAN AREA CLINIC 116 | Facility: CLINIC | Age: 67
Setting detail: DERMATOLOGY
End: 2022-03-17

## 2022-03-17 DIAGNOSIS — L80 VITILIGO: ICD-10-CM

## 2022-03-17 PROCEDURE — ? EXCIMER LASER

## 2022-03-17 PROCEDURE — 96920 EXCIMER LSR PSRIASIS<250SQCM: CPT

## 2022-03-17 ASSESSMENT — LOCATION ZONE DERM
LOCATION ZONE: FINGER
LOCATION ZONE: FACE

## 2022-03-17 NOTE — PROCEDURE: EXCIMER LASER
Consent: Written consent obtained, risks reviewed including but not limited to crusting, scabbing, blistering, scarring, darker or lighter pigmentary change, incidental hair removal, bruising, and/or incomplete removal.
Mode: tile
Post-Care Instructions: I reviewed with the patient in detail post-care instructions. Patient should stay away from the sun and wear sun protection until treated areas are fully healed.
Detail Level: Detailed
Location #1: face and fingers
Comments on Previous Treatment: Pt. states she tolerated last tx well, no complaints. \\nChanges in Treatment Protocol: Decrease Excimer Laser dosing by 40% per Edil Guadalajara with [address] 9/7/21.
Total Square Area In Cm2 (Required For Proper Billing- Whole Numbers Only Please): [address]
Fluence #1 (J/Cm2 Or Mj/Cm2): 1000 mj  hold
Fluence Units: mJ/cm2
Spot Size: 3 x 3 cm
Treatment Number: [address]

## 2022-03-22 ENCOUNTER — APPOINTMENT (RX ONLY)
Dept: URBAN - METROPOLITAN AREA CLINIC 116 | Facility: CLINIC | Age: 67
Setting detail: DERMATOLOGY
End: 2022-03-22

## 2022-03-22 DIAGNOSIS — L80 VITILIGO: ICD-10-CM

## 2022-03-22 PROCEDURE — ? EXCIMER LASER

## 2022-03-22 PROCEDURE — 96920 EXCIMER LSR PSRIASIS<250SQCM: CPT

## 2022-03-22 ASSESSMENT — LOCATION ZONE DERM
LOCATION ZONE: FINGER
LOCATION ZONE: FACE

## 2022-03-22 NOTE — PROCEDURE: EXCIMER LASER
Consent: Written consent obtained, risks reviewed including but not limited to crusting, scabbing, blistering, scarring, darker or lighter pigmentary change, incidental hair removal, bruising, and/or incomplete removal.
Mode: tile
Post-Care Instructions: I reviewed with the patient in detail post-care instructions. Patient should stay away from the sun and wear sun protection until treated areas are fully healed.
Detail Level: Detailed
Location #1: face and fingers
Comments on Previous Treatment: Pt. states she tolerated last tx well, no complaints. \\nChanges in Treatment Protocol: Decrease Excimer Laser dosing by 40% per Joy Son 51 9/7/21.
Total Square Area In Cm2 (Required For Proper Billing- Whole Numbers Only Please): [address] [facility]
Fluence #1 (J/Cm2 Or Mj/Cm2): 1000 mj  hold
Fluence Units: mJ/cm2
Spot Size: 3 x 3 cm
Treatment Number: [address]

## 2022-03-24 ENCOUNTER — APPOINTMENT (RX ONLY)
Dept: URBAN - METROPOLITAN AREA CLINIC 116 | Facility: CLINIC | Age: 67
Setting detail: DERMATOLOGY
End: 2022-03-24

## 2022-03-24 DIAGNOSIS — L80 VITILIGO: ICD-10-CM

## 2022-03-24 PROCEDURE — 96920 EXCIMER LSR PSRIASIS<250SQCM: CPT

## 2022-03-24 PROCEDURE — ? EXCIMER LASER

## 2022-03-24 ASSESSMENT — LOCATION ZONE DERM
LOCATION ZONE: FACE
LOCATION ZONE: FINGER

## 2022-03-24 NOTE — PROCEDURE: EXCIMER LASER
Consent: Written consent obtained, risks reviewed including but not limited to crusting, scabbing, blistering, scarring, darker or lighter pigmentary change, incidental hair removal, bruising, and/or incomplete removal.
Mode: tile
Post-Care Instructions: I reviewed with the patient in detail post-care instructions. Patient should stay away from the sun and wear sun protection until treated areas are fully healed.
Detail Level: Detailed
Location #1: face and fingers
Comments on Previous Treatment: Pt. states she tolerated last tx well, no complaints. \\nChanges in Treatment Protocol: Decrease Excimer Laser dosing by 40% per Edil Guadalajara with [address] 9/7/21.
Total Square Area In Cm2 (Required For Proper Billing- Whole Numbers Only Please): [address]
Fluence #1 (J/Cm2 Or Mj/Cm2): 1000 mj  hold
Fluence Units: mJ/cm2
Spot Size: 3 x 3 cm
Treatment Number: 24[address] [facility]

## 2022-03-29 ENCOUNTER — APPOINTMENT (RX ONLY)
Dept: URBAN - METROPOLITAN AREA CLINIC 116 | Facility: CLINIC | Age: 67
Setting detail: DERMATOLOGY
End: 2022-03-29

## 2022-03-29 DIAGNOSIS — L80 VITILIGO: ICD-10-CM

## 2022-03-29 PROCEDURE — ? EXCIMER LASER

## 2022-03-29 PROCEDURE — 96920 EXCIMER LSR PSRIASIS<250SQCM: CPT

## 2022-03-29 ASSESSMENT — LOCATION ZONE DERM
LOCATION ZONE: FACE
LOCATION ZONE: FINGER

## 2022-03-29 NOTE — PROCEDURE: EXCIMER LASER
Consent: Written consent obtained, risks reviewed including but not limited to crusting, scabbing, blistering, scarring, darker or lighter pigmentary change, incidental hair removal, bruising, and/or incomplete removal.
Mode: tile
Post-Care Instructions: I reviewed with the patient in detail post-care instructions. Patient should stay away from the sun and wear sun protection until treated areas are fully healed.
Detail Level: Detailed
Location #1: face and fingers
Comments on Previous Treatment: Pt. states she tolerated last tx well, no complaints. \\nChanges in Treatment Protocol: Decrease Excimer Laser dosing by 40% per Mitzy Son 51 9/7/21.
Total Square Area In Cm2 (Required For Proper Billing- Whole Numbers Only Please): [address]
Fluence #1 (J/Cm2 Or Mj/Cm2): 1000 mj  hold
Fluence Units: mJ/cm2
Spot Size: 3 x 3 cm
Treatment Number: 1400 [facility]

## 2022-03-31 ENCOUNTER — APPOINTMENT (RX ONLY)
Dept: URBAN - METROPOLITAN AREA CLINIC 116 | Facility: CLINIC | Age: 67
Setting detail: DERMATOLOGY
End: 2022-03-31

## 2022-03-31 DIAGNOSIS — L80 VITILIGO: ICD-10-CM

## 2022-03-31 PROCEDURE — 96920 EXCIMER LSR PSRIASIS<250SQCM: CPT

## 2022-03-31 PROCEDURE — ? EXCIMER LASER

## 2022-03-31 ASSESSMENT — LOCATION ZONE DERM
LOCATION ZONE: FINGER
LOCATION ZONE: FACE

## 2022-03-31 NOTE — PROCEDURE: EXCIMER LASER
Consent: Written consent obtained, risks reviewed including but not limited to crusting, scabbing, blistering, scarring, darker or lighter pigmentary change, incidental hair removal, bruising, and/or incomplete removal.
Mode: tile
Post-Care Instructions: I reviewed with the patient in detail post-care instructions. Patient should stay away from the sun and wear sun protection until treated areas are fully healed.
Detail Level: Detailed
Location #1: face and fingers
Comments on Previous Treatment: Pt. states she tolerated last tx well, no complaints. \\nChanges in Treatment Protocol: Decrease Excimer Laser dosing by 40% per Elizabet Rogers 9/7/21.
Total Square Area In Cm2 (Required For Proper Billing- Whole Numbers Only Please): [address] Lov[address]
Fluence #1 (J/Cm2 Or Mj/Cm2): 1000 mj  hold
Fluence Units: mJ/cm2
Spot Size: 3 x 3 cm
Treatment Number: 26[address] [facility]

## 2022-04-04 ENCOUNTER — APPOINTMENT (RX ONLY)
Dept: URBAN - METROPOLITAN AREA CLINIC 116 | Facility: CLINIC | Age: 67
Setting detail: DERMATOLOGY
End: 2022-04-04

## 2022-04-06 ENCOUNTER — APPOINTMENT (RX ONLY)
Dept: URBAN - METROPOLITAN AREA CLINIC 116 | Facility: CLINIC | Age: 67
Setting detail: DERMATOLOGY
End: 2022-04-06

## 2022-04-06 DIAGNOSIS — L80 VITILIGO: ICD-10-CM

## 2022-04-06 PROCEDURE — 96920 EXCIMER LSR PSRIASIS<250SQCM: CPT

## 2022-04-06 PROCEDURE — ? EXCIMER LASER

## 2022-04-06 ASSESSMENT — LOCATION ZONE DERM
LOCATION ZONE: FACE
LOCATION ZONE: FINGER

## 2022-04-06 NOTE — PROCEDURE: EXCIMER LASER
Consent: Written consent obtained, risks reviewed including but not limited to crusting, scabbing, blistering, scarring, darker or lighter pigmentary change, incidental hair removal, bruising, and/or incomplete removal.
Mode: tile
Post-Care Instructions: I reviewed with the patient in detail post-care instructions. Patient should stay away from the sun and wear sun protection until treated areas are fully healed.
Detail Level: Detailed
Location #1: face and fingers
Comments on Previous Treatment: Pt. states she tolerated last tx well, no complaints. \\nChanges in Treatment Protocol: Decrease Excimer Laser dosing by 40% per Kasandra Son 51 9/7/21.
Total Square Area In Cm2 (Required For Proper Billing- Whole Numbers Only Please): 1000 [facility]
Fluence #1 (J/Cm2 Or Mj/Cm2): 1000 mj  hold
Fluence Units: mJ/cm2
Spot Size: 3 x 3 cm
Treatment Number: [address]

## 2022-04-12 ENCOUNTER — APPOINTMENT (RX ONLY)
Dept: URBAN - METROPOLITAN AREA CLINIC 116 | Facility: CLINIC | Age: 67
Setting detail: DERMATOLOGY
End: 2022-04-12

## 2022-04-12 DIAGNOSIS — L80 VITILIGO: ICD-10-CM

## 2022-04-12 PROCEDURE — ? EXCIMER LASER

## 2022-04-12 PROCEDURE — 96920 EXCIMER LSR PSRIASIS<250SQCM: CPT

## 2022-04-12 ASSESSMENT — LOCATION ZONE DERM
LOCATION ZONE: FINGER
LOCATION ZONE: FACE

## 2022-04-12 NOTE — PROCEDURE: EXCIMER LASER
Consent: Written consent obtained, risks reviewed including but not limited to crusting, scabbing, blistering, scarring, darker or lighter pigmentary change, incidental hair removal, bruising, and/or incomplete removal.
Mode: tile
Post-Care Instructions: I reviewed with the patient in detail post-care instructions. Patient should stay away from the sun and wear sun protection until treated areas are fully healed.
Detail Level: Detailed
Location #1: face and fingers
Comments on Previous Treatment: Pt. states she tolerated last tx well, no complaints. \\nChanges in Treatment Protocol: Decrease Excimer Laser dosing by 40% per Evangelista Son 51 9/7/21.
Total Square Area In Cm2 (Required For Proper Billing- Whole Numbers Only Please): [address]
Fluence #1 (J/Cm2 Or Mj/Cm2): 1000 mj  hold
Fluence Units: mJ/cm2
Spot Size: 3 x 3 cm
Treatment Number: [address] [facility]

## 2022-04-14 ENCOUNTER — APPOINTMENT (RX ONLY)
Dept: URBAN - METROPOLITAN AREA CLINIC 116 | Facility: CLINIC | Age: 67
Setting detail: DERMATOLOGY
End: 2022-04-14

## 2022-04-14 DIAGNOSIS — L80 VITILIGO: ICD-10-CM

## 2022-04-14 DIAGNOSIS — L80 VITILIGO: ICD-10-CM | Status: IMPROVED

## 2022-04-14 PROCEDURE — ? MEDICAL CONSULTATION: EXCIMER LASER

## 2022-04-14 PROCEDURE — 99214 OFFICE O/P EST MOD 30 MIN: CPT

## 2022-04-14 PROCEDURE — ? COUNSELING

## 2022-04-14 PROCEDURE — 96920 EXCIMER LSR PSRIASIS<250SQCM: CPT

## 2022-04-14 PROCEDURE — ? PRESCRIPTION MEDICATION MANAGEMENT

## 2022-04-14 PROCEDURE — ? EXCIMER LASER

## 2022-04-14 ASSESSMENT — LOCATION SIMPLE DESCRIPTION DERM
LOCATION SIMPLE: LEFT EYEBROW
LOCATION SIMPLE: LEFT LIP
LOCATION SIMPLE: LEFT FOREHEAD
LOCATION SIMPLE: RIGHT EYEBROW
LOCATION SIMPLE: RIGHT CHEEK
LOCATION SIMPLE: RIGHT LIP
LOCATION SIMPLE: RIGHT MIDDLE FINGER
LOCATION SIMPLE: LEFT MIDDLE FINGER
LOCATION SIMPLE: RIGHT SMALL FINGER
LOCATION SIMPLE: LEFT RING FINGER
LOCATION SIMPLE: LEFT SMALL FINGER

## 2022-04-14 ASSESSMENT — LOCATION DETAILED DESCRIPTION DERM
LOCATION DETAILED: LEFT RING FINGER DISTAL INTERPHALANGEAL JOINT
LOCATION DETAILED: LEFT DISTAL DORSAL SMALL FINGER
LOCATION DETAILED: RIGHT MID DORSAL MIDDLE FINGER
LOCATION DETAILED: LEFT MID DORSAL MIDDLE FINGER
LOCATION DETAILED: RIGHT LATERAL EYEBROW
LOCATION DETAILED: RIGHT UPPER CUTANEOUS LIP
LOCATION DETAILED: LEFT INFERIOR VERMILION LIP
LOCATION DETAILED: RIGHT INFERIOR CENTRAL MALAR CHEEK
LOCATION DETAILED: LEFT LATERAL EYEBROW
LOCATION DETAILED: LEFT INFERIOR LATERAL FOREHEAD
LOCATION DETAILED: RIGHT MID DORSAL SMALL FINGER
LOCATION DETAILED: LEFT SUPERIOR VERMILION LIP

## 2022-04-14 ASSESSMENT — LOCATION ZONE DERM
LOCATION ZONE: LIP
LOCATION ZONE: FACE
LOCATION ZONE: FACE
LOCATION ZONE: FINGER
LOCATION ZONE: FINGER

## 2022-04-14 NOTE — PROCEDURE: EXCIMER LASER
Consent: Written consent obtained, risks reviewed including but not limited to crusting, scabbing, blistering, scarring, darker or lighter pigmentary change, incidental hair removal, bruising, and/or incomplete removal.
Mode: tile
Post-Care Instructions: I reviewed with the patient in detail post-care instructions. Patient should stay away from the sun and wear sun protection until treated areas are fully healed.
Detail Level: Detailed
Location #1: face and fingers
Total Square Area In Cm2 (Required For Proper Billing- Whole Numbers Only Please): 75
Fluence #1 (J/Cm2 Or Mj/Cm2): 1000 mj  hold
Fluence Units: mJ/cm2
Spot Size: 3 x 3 cm
Treatment Number: Chapis [facility]

## 2022-04-18 ENCOUNTER — APPOINTMENT (RX ONLY)
Dept: URBAN - METROPOLITAN AREA CLINIC 116 | Facility: CLINIC | Age: 67
Setting detail: DERMATOLOGY
End: 2022-04-18

## 2022-04-18 DIAGNOSIS — L80 VITILIGO: ICD-10-CM

## 2022-04-18 PROCEDURE — ? EXCIMER LASER

## 2022-04-18 PROCEDURE — 96920 EXCIMER LSR PSRIASIS<250SQCM: CPT

## 2022-04-18 ASSESSMENT — LOCATION ZONE DERM
LOCATION ZONE: FACE
LOCATION ZONE: FINGER

## 2022-04-18 NOTE — PROCEDURE: EXCIMER LASER
Consent: Written consent obtained, risks reviewed including but not limited to crusting, scabbing, blistering, scarring, darker or lighter pigmentary change, incidental hair removal, bruising, and/or incomplete removal.
Mode: tile
Post-Care Instructions: I reviewed with the patient in detail post-care instructions. Patient should stay away from the sun and wear sun protection until treated areas are fully healed.
Detail Level: Detailed
Location #1: face and fingers
Total Square Area In Cm2 (Required For Proper Billing- Whole Numbers Only Please): [address]
Fluence #1 (J/Cm2 Or Mj/Cm2): 1000 mj  hold
Fluence Units: mJ/cm2
Spot Size: 3 x 3 cm
Treatment Number: Hussein Yrok

## 2022-04-26 ENCOUNTER — APPOINTMENT (RX ONLY)
Dept: URBAN - METROPOLITAN AREA CLINIC 116 | Facility: CLINIC | Age: 67
Setting detail: DERMATOLOGY
End: 2022-04-26

## 2022-04-26 DIAGNOSIS — L80 VITILIGO: ICD-10-CM

## 2022-04-26 PROCEDURE — 96920 EXCIMER LSR PSRIASIS<250SQCM: CPT

## 2022-04-26 PROCEDURE — ? EXCIMER LASER

## 2022-04-26 ASSESSMENT — LOCATION ZONE DERM
LOCATION ZONE: FINGER
LOCATION ZONE: FACE

## 2022-04-26 NOTE — PROCEDURE: EXCIMER LASER
Consent: Written consent obtained, risks reviewed including but not limited to crusting, scabbing, blistering, scarring, darker or lighter pigmentary change, incidental hair removal, bruising, and/or incomplete removal.
Mode: tile
Post-Care Instructions: I reviewed with the patient in detail post-care instructions. Patient should stay away from the sun and wear sun protection until treated areas are fully healed.
Device Serial Number (Optional): xtrac/ jh
Detail Level: Detailed
Location #1: face and fingers
Comments: [facility] id is [id number]
Total Square Area In Cm2 (Required For Proper Billing- Whole Numbers Only Please): 1000 [facility]
Fluence #1 (J/Cm2 Or Mj/Cm2): 500 mj decrease due to new machine
Fluence Units: mJ/cm2
Spot Size: 3 x 3 cm
Treatment Number: [facility]

## 2022-05-03 ENCOUNTER — APPOINTMENT (RX ONLY)
Dept: URBAN - METROPOLITAN AREA CLINIC 116 | Facility: CLINIC | Age: 67
Setting detail: DERMATOLOGY
End: 2022-05-03

## 2022-05-03 DIAGNOSIS — L80 VITILIGO: ICD-10-CM

## 2022-05-03 PROCEDURE — 96920 EXCIMER LSR PSRIASIS<250SQCM: CPT

## 2022-05-03 PROCEDURE — ? EXCIMER LASER

## 2022-05-03 ASSESSMENT — LOCATION ZONE DERM
LOCATION ZONE: FINGER
LOCATION ZONE: FACE

## 2022-05-03 NOTE — PROCEDURE: EXCIMER LASER
Consent: Written consent obtained, risks reviewed including but not limited to crusting, scabbing, blistering, scarring, darker or lighter pigmentary change, incidental hair removal, bruising, and/or incomplete removal.
Mode: tile
Post-Care Instructions: I reviewed with the patient in detail post-care instructions. Patient should stay away from the sun and wear sun protection until treated areas are fully healed.
Device Serial Number (Optional): xtrac/ jh
Detail Level: Detailed
Location #1: face and fingers
Comments: [facility] id is [id number]
Total Square Area In Cm2 (Required For Proper Billing- Whole Numbers Only Please): 100
Fluence #1 (J/Cm2 Or Mj/Cm2): 500 mj decrease due to new machine
Fluence Units: mJ/cm2
Spot Size: 3 x 3 cm
Treatment Number: [place]

## 2022-05-10 ENCOUNTER — APPOINTMENT (RX ONLY)
Dept: URBAN - METROPOLITAN AREA CLINIC 116 | Facility: CLINIC | Age: 67
Setting detail: DERMATOLOGY
End: 2022-05-10

## 2022-05-10 DIAGNOSIS — L80 VITILIGO: ICD-10-CM

## 2022-05-10 PROCEDURE — ? EXCIMER LASER

## 2022-05-10 PROCEDURE — 96920 EXCIMER LSR PSRIASIS<250SQCM: CPT

## 2022-05-10 ASSESSMENT — LOCATION ZONE DERM
LOCATION ZONE: FINGER
LOCATION ZONE: FACE

## 2022-05-10 NOTE — PROCEDURE: EXCIMER LASER
Consent: Written consent obtained, risks reviewed including but not limited to crusting, scabbing, blistering, scarring, darker or lighter pigmentary change, incidental hair removal, bruising, and/or incomplete removal.
Mode: tile
Post-Care Instructions: I reviewed with the patient in detail post-care instructions. Patient should stay away from the sun and wear sun protection until treated areas are fully healed.
Device Serial Number (Optional): xtrac/ jh
Detail Level: Detailed
Location #1: face and fingers
Comments: [facility] id is [id number]
Total Square Area In Cm2 (Required For Proper Billing- Whole Numbers Only Please): [address]
Fluence #1 (J/Cm2 Or Mj/Cm2): 575 mj
Fluence Units: mJ/cm2
Spot Size: 3 x 3 cm
Treatment Number: [address]

## 2022-05-17 ENCOUNTER — APPOINTMENT (RX ONLY)
Dept: URBAN - METROPOLITAN AREA CLINIC 114 | Facility: CLINIC | Age: 67
Setting detail: DERMATOLOGY
End: 2022-05-17

## 2022-05-17 DIAGNOSIS — L80 VITILIGO: ICD-10-CM

## 2022-05-17 PROCEDURE — ? EXCIMER LASER

## 2022-05-17 PROCEDURE — 96920 EXCIMER LSR PSRIASIS<250SQCM: CPT

## 2022-05-17 ASSESSMENT — LOCATION ZONE DERM
LOCATION ZONE: FINGER
LOCATION ZONE: FACE

## 2022-05-17 NOTE — PROCEDURE: EXCIMER LASER
Consent: Written consent obtained, risks reviewed including but not limited to crusting, scabbing, blistering, scarring, darker or lighter pigmentary change, incidental hair removal, bruising, and/or incomplete removal.
Mode: tile
Post-Care Instructions: I reviewed with the patient in detail post-care instructions. Patient should stay away from the sun and wear sun protection until treated areas are fully healed.
Device Serial Number (Optional): xtrac/ jh
Detail Level: Detailed
Location #1: face and fingers
Comments: [facility] id is [id number]
Total Square Area In Cm2 (Required For Proper Billing- Whole Numbers Only Please): 100
Fluence #1 (J/Cm2 Or Mj/Cm2): 661 mj
Fluence Units: mJ/cm2
Spot Size: 3 x 3 cm
Treatment Number: [address]

## 2022-05-24 ENCOUNTER — APPOINTMENT (RX ONLY)
Dept: URBAN - METROPOLITAN AREA CLINIC 116 | Facility: CLINIC | Age: 67
Setting detail: DERMATOLOGY
End: 2022-05-24

## 2022-05-24 DIAGNOSIS — L80 VITILIGO: ICD-10-CM

## 2022-05-24 PROCEDURE — 96920 EXCIMER LSR PSRIASIS<250SQCM: CPT

## 2022-05-24 PROCEDURE — ? EXCIMER LASER

## 2022-05-24 ASSESSMENT — LOCATION ZONE DERM
LOCATION ZONE: FACE
LOCATION ZONE: FINGER

## 2022-06-08 ENCOUNTER — APPOINTMENT (RX ONLY)
Dept: URBAN - METROPOLITAN AREA CLINIC 116 | Facility: CLINIC | Age: 67
Setting detail: DERMATOLOGY
End: 2022-06-08

## 2022-06-08 DIAGNOSIS — L80 VITILIGO: ICD-10-CM

## 2022-06-08 PROCEDURE — 96920 EXCIMER LSR PSRIASIS<250SQCM: CPT

## 2022-06-08 PROCEDURE — ? EXCIMER LASER

## 2022-06-08 ASSESSMENT — LOCATION ZONE DERM
LOCATION ZONE: FACE
LOCATION ZONE: FINGER

## 2022-06-08 NOTE — PROCEDURE: EXCIMER LASER
Consent: Written consent obtained, risks reviewed including but not limited to crusting, scabbing, blistering, scarring, darker or lighter pigmentary change, incidental hair removal, bruising, and/or incomplete removal.
Mode: tile
Post-Care Instructions: I reviewed with the patient in detail post-care instructions. Patient should stay away from the sun and wear sun protection until treated areas are fully healed.
Device Serial Number (Optional): xtrac/ jh
Detail Level: Detailed
Location #1: face and fingers
Comments: [facility] id is [id number]
Total Square Area In Cm2 (Required For Proper Billing- Whole Numbers Only Please):  [facility]
Fluence #1 (J/Cm2 Or Mj/Cm2): 760  mj hold today
Fluence Units: mJ/cm2
Spot Size: 3 x 3 cm
Treatment Number: 506 [facility]

## 2022-06-17 ENCOUNTER — APPOINTMENT (RX ONLY)
Dept: URBAN - METROPOLITAN AREA CLINIC 116 | Facility: CLINIC | Age: 67
Setting detail: DERMATOLOGY
End: 2022-06-17

## 2022-06-17 DIAGNOSIS — L80 VITILIGO: ICD-10-CM

## 2022-06-17 PROCEDURE — ? EXCIMER LASER

## 2022-06-17 PROCEDURE — 96920 EXCIMER LSR PSRIASIS<250SQCM: CPT

## 2022-06-17 ASSESSMENT — LOCATION ZONE DERM
LOCATION ZONE: FACE
LOCATION ZONE: FINGER

## 2022-06-17 NOTE — PROCEDURE: EXCIMER LASER
Consent: Written consent obtained, risks reviewed including but not limited to crusting, scabbing, blistering, scarring, darker or lighter pigmentary change, incidental hair removal, bruising, and/or incomplete removal.
Mode: tile
Post-Care Instructions: I reviewed with the patient in detail post-care instructions. Patient should stay away from the sun and wear sun protection until treated areas are fully healed.
Device Serial Number (Optional): xtrac/ jh
Detail Level: Detailed
Location #1: face and fingers
Comments: [facility] id is [id number]
Total Square Area In Cm2 (Required For Proper Billing- Whole Numbers Only Please): 100
Fluence #1 (J/Cm2 Or Mj/Cm2): 874 mj
Fluence Units: mJ/cm2
Spot Size: 3 x 3 cm
Treatment Number: [address]

## 2022-06-24 ENCOUNTER — APPOINTMENT (RX ONLY)
Dept: URBAN - METROPOLITAN AREA CLINIC 116 | Facility: CLINIC | Age: 67
Setting detail: DERMATOLOGY
End: 2022-06-24

## 2022-06-24 DIAGNOSIS — L80 VITILIGO: ICD-10-CM

## 2022-06-24 PROCEDURE — 96920 EXCIMER LSR PSRIASIS<250SQCM: CPT

## 2022-06-24 PROCEDURE — ? EXCIMER LASER

## 2022-06-24 ASSESSMENT — LOCATION ZONE DERM
LOCATION ZONE: FINGER
LOCATION ZONE: FACE

## 2022-06-24 NOTE — PROCEDURE: EXCIMER LASER
Consent: Written consent obtained, risks reviewed including but not limited to crusting, scabbing, blistering, scarring, darker or lighter pigmentary change, incidental hair removal, bruising, and/or incomplete removal.
Mode: tile
Post-Care Instructions: I reviewed with the patient in detail post-care instructions. Patient should stay away from the sun and wear sun protection until treated areas are fully healed.
Device Serial Number (Optional): xtrac/ jh
Detail Level: Detailed
Location #1: face and fingers
Comments: [facility] id is [id number]
Total Square Area In Cm2 (Required For Proper Billing- Whole Numbers Only Please): 80
Fluence #1 (J/Cm2 Or Mj/Cm2): 1005 mj
Fluence Units: mJ/cm2
Spot Size: 3 x 3 cm
Treatment Number: 100 [facility]

## 2022-06-27 ENCOUNTER — APPOINTMENT (RX ONLY)
Dept: URBAN - METROPOLITAN AREA CLINIC 116 | Facility: CLINIC | Age: 67
Setting detail: DERMATOLOGY
End: 2022-06-27

## 2022-06-27 DIAGNOSIS — L80 VITILIGO: ICD-10-CM

## 2022-06-27 PROCEDURE — 96920 EXCIMER LSR PSRIASIS<250SQCM: CPT

## 2022-06-27 PROCEDURE — ? EXCIMER LASER

## 2022-06-27 ASSESSMENT — LOCATION ZONE DERM
LOCATION ZONE: FINGER
LOCATION ZONE: FACE

## 2022-06-27 NOTE — PROCEDURE: EXCIMER LASER
Consent: Written consent obtained, risks reviewed including but not limited to crusting, scabbing, blistering, scarring, darker or lighter pigmentary change, incidental hair removal, bruising, and/or incomplete removal.
Mode: tile
Post-Care Instructions: I reviewed with the patient in detail post-care instructions. Patient should stay away from the sun and wear sun protection until treated areas are fully healed.
Device Serial Number (Optional): xtrac/ jh
Detail Level: Detailed
Location #1: face and fingers
Comments: [facility] id is [id number]
Total Square Area In Cm2 (Required For Proper Billing- Whole Numbers Only Please): 88
Fluence #1 (J/Cm2 Or Mj/Cm2): 1005 mj
Fluence Units: mJ/cm2
Spot Size: 3 x 3 cm
Treatment Number: [address] [facility]

## 2022-07-15 ENCOUNTER — APPOINTMENT (RX ONLY)
Dept: URBAN - METROPOLITAN AREA CLINIC 116 | Facility: CLINIC | Age: 67
Setting detail: DERMATOLOGY
End: 2022-07-15

## 2022-07-15 DIAGNOSIS — L80 VITILIGO: ICD-10-CM

## 2022-07-15 PROCEDURE — 96920 EXCIMER LSR PSRIASIS<250SQCM: CPT

## 2022-07-15 PROCEDURE — ? EXCIMER LASER

## 2022-07-15 ASSESSMENT — LOCATION ZONE DERM
LOCATION ZONE: FACE
LOCATION ZONE: FINGER

## 2022-07-15 NOTE — PROCEDURE: EXCIMER LASER
Consent: Written consent obtained, risks reviewed including but not limited to crusting, scabbing, blistering, scarring, darker or lighter pigmentary change, incidental hair removal, bruising, and/or incomplete removal.
Mode: tile
Post-Care Instructions: I reviewed with the patient in detail post-care instructions. Patient should stay away from the sun and wear sun protection until treated areas are fully healed.
Device Serial Number (Optional): xtrac/ jh
Detail Level: Detailed
Location #1: face and fingers
Comments: [facility] id is [id number]
Total Square Area In Cm2 (Required For Proper Billing- Whole Numbers Only Please): [address]
Fluence #1 (J/Cm2 Or Mj/Cm2): 804 mj
Fluence Units: mJ/cm2
Spot Size: 3 x 3 cm
Treatment Number: 75

## 2022-08-05 ENCOUNTER — APPOINTMENT (RX ONLY)
Dept: URBAN - METROPOLITAN AREA CLINIC 116 | Facility: CLINIC | Age: 67
Setting detail: DERMATOLOGY
End: 2022-08-05

## 2022-08-05 DIAGNOSIS — L80 VITILIGO: ICD-10-CM

## 2022-08-05 PROCEDURE — 96920 EXCIMER LSR PSRIASIS<250SQCM: CPT

## 2022-08-05 PROCEDURE — ? EXCIMER LASER

## 2022-08-05 ASSESSMENT — LOCATION ZONE DERM
LOCATION ZONE: FINGER
LOCATION ZONE: FACE

## 2022-08-05 NOTE — PROCEDURE: EXCIMER LASER
Consent: Written consent obtained, risks reviewed including but not limited to crusting, scabbing, blistering, scarring, darker or lighter pigmentary change, incidental hair removal, bruising, and/or incomplete removal.
Mode: tile
Post-Care Instructions: I reviewed with the patient in detail post-care instructions. Patient should stay away from the sun and wear sun protection until treated areas are fully healed.
Device Serial Number (Optional): xtrac/ jh
Detail Level: Detailed
Location #1: face and fingers
Comments: [facility] id is [id number]
Total Square Area In Cm2 (Required For Proper Billing- Whole Numbers Only Please): 100
Fluence #1 (J/Cm2 Or Mj/Cm2): 804 mj
Fluence Units: mJ/cm2
Spot Size: 3 x 3 cm
Treatment Number: [address]

## 2022-08-15 ENCOUNTER — APPOINTMENT (RX ONLY)
Dept: URBAN - METROPOLITAN AREA CLINIC 116 | Facility: CLINIC | Age: 67
Setting detail: DERMATOLOGY
End: 2022-08-15

## 2022-08-15 DIAGNOSIS — L80 VITILIGO: ICD-10-CM

## 2022-08-15 PROCEDURE — 96920 EXCIMER LSR PSRIASIS<250SQCM: CPT

## 2022-08-15 PROCEDURE — ? EXCIMER LASER

## 2022-08-15 ASSESSMENT — LOCATION ZONE DERM
LOCATION ZONE: FACE
LOCATION ZONE: FINGER

## 2022-08-15 NOTE — PROCEDURE: EXCIMER LASER
Consent: Written consent obtained, risks reviewed including but not limited to crusting, scabbing, blistering, scarring, darker or lighter pigmentary change, incidental hair removal, bruising, and/or incomplete removal.
Mode: tile
Post-Care Instructions: I reviewed with the patient in detail post-care instructions. Patient should stay away from the sun and wear sun protection until treated areas are fully healed.
Device Serial Number (Optional): xtrac/ jh
Detail Level: Detailed
Location #1: face and fingers
Comments: [facility] id is [id number]
Total Square Area In Cm2 (Required For Proper Billing- Whole Numbers Only Please): [address]
Fluence #1 (J/Cm2 Or Mj/Cm2): 462 mj
Fluence Units: mJ/cm2
Spot Size: 3 x 3 cm
Treatment Number: [address]

## 2022-08-19 ENCOUNTER — APPOINTMENT (RX ONLY)
Dept: URBAN - METROPOLITAN AREA CLINIC 116 | Facility: CLINIC | Age: 67
Setting detail: DERMATOLOGY
End: 2022-08-19

## 2022-08-19 DIAGNOSIS — L80 VITILIGO: ICD-10-CM

## 2022-08-19 PROCEDURE — 96920 EXCIMER LSR PSRIASIS<250SQCM: CPT

## 2022-08-19 PROCEDURE — ? EXCIMER LASER

## 2022-08-19 ASSESSMENT — LOCATION ZONE DERM
LOCATION ZONE: FACE
LOCATION ZONE: FINGER

## 2022-08-19 NOTE — PROCEDURE: EXCIMER LASER
Consent: Written consent obtained, risks reviewed including but not limited to crusting, scabbing, blistering, scarring, darker or lighter pigmentary change, incidental hair removal, bruising, and/or incomplete removal.
Mode: tile
Post-Care Instructions: I reviewed with the patient in detail post-care instructions. Patient should stay away from the sun and wear sun protection until treated areas are fully healed.
Device Serial Number (Optional): xtrac/ jh
Detail Level: Detailed
Location #1: face and fingers
Comments: [facility] id is [id number]
Total Square Area In Cm2 (Required For Proper Billing- Whole Numbers Only Please): [address]
Fluence #1 (J/Cm2 Or Mj/Cm2): 462 mj
Fluence Units: mJ/cm2
Spot Size: 3 x 3 cm
Treatment Number: [address]

## 2022-08-26 ENCOUNTER — APPOINTMENT (RX ONLY)
Dept: URBAN - METROPOLITAN AREA CLINIC 116 | Facility: CLINIC | Age: 67
Setting detail: DERMATOLOGY
End: 2022-08-26

## 2022-08-26 DIAGNOSIS — L80 VITILIGO: ICD-10-CM

## 2022-08-26 PROCEDURE — ? EXCIMER LASER

## 2022-08-26 PROCEDURE — 96920 EXCIMER LSR PSRIASIS<250SQCM: CPT

## 2022-08-26 ASSESSMENT — LOCATION ZONE DERM
LOCATION ZONE: FINGER
LOCATION ZONE: FACE

## 2022-08-26 NOTE — PROCEDURE: EXCIMER LASER
Consent: Written consent obtained, risks reviewed including but not limited to crusting, scabbing, blistering, scarring, darker or lighter pigmentary change, incidental hair removal, bruising, and/or incomplete removal.
Mode: tile
Post-Care Instructions: I reviewed with the patient in detail post-care instructions. Patient should stay away from the sun and wear sun protection until treated areas are fully healed.
Device Serial Number (Optional): xtrac/ jh
Detail Level: Detailed
Location #1: face and fingers
Comments: [facility] id is [id number]
Total Square Area In Cm2 (Required For Proper Billing- Whole Numbers Only Please): One [facility]
Fluence #1 (J/Cm2 Or Mj/Cm2): 611  mj
Fluence Units: mJ/cm2
Spot Size: 3 x 3 cm
Treatment Number: 80

## 2022-09-02 ENCOUNTER — APPOINTMENT (RX ONLY)
Dept: URBAN - METROPOLITAN AREA CLINIC 116 | Facility: CLINIC | Age: 67
Setting detail: DERMATOLOGY
End: 2022-09-02

## 2022-09-02 DIAGNOSIS — L80 VITILIGO: ICD-10-CM

## 2022-09-02 PROCEDURE — 96920 EXCIMER LSR PSRIASIS<250SQCM: CPT

## 2022-09-02 PROCEDURE — ? EXCIMER LASER

## 2022-09-02 ASSESSMENT — LOCATION ZONE DERM
LOCATION ZONE: FINGER
LOCATION ZONE: FACE

## 2022-09-02 NOTE — PROCEDURE: EXCIMER LASER
Consent: Written consent obtained, risks reviewed including but not limited to crusting, scabbing, blistering, scarring, darker or lighter pigmentary change, incidental hair removal, bruising, and/or incomplete removal.
Mode: tile
Post-Care Instructions: I reviewed with the patient in detail post-care instructions. Patient should stay away from the sun and wear sun protection until treated areas are fully healed.
Device Serial Number (Optional): xtrac/ jh
Detail Level: Detailed
Location #1: face and fingers
Comments: [facility] id is [id number]
Total Square Area In Cm2 (Required For Proper Billing- Whole Numbers Only Please): 88
Fluence #1 (J/Cm2 Or Mj/Cm2): 703 mj
Fluence Units: mJ/cm2
Spot Size: 3 x 3 cm
Treatment Number: 1752 [facility]

## 2022-09-07 ENCOUNTER — APPOINTMENT (RX ONLY)
Dept: URBAN - METROPOLITAN AREA CLINIC 116 | Facility: CLINIC | Age: 67
Setting detail: DERMATOLOGY
End: 2022-09-07

## 2022-09-07 DIAGNOSIS — L80 VITILIGO: ICD-10-CM

## 2022-09-07 PROCEDURE — ? EXCIMER LASER

## 2022-09-07 PROCEDURE — 96920 EXCIMER LSR PSRIASIS<250SQCM: CPT

## 2022-09-07 ASSESSMENT — LOCATION ZONE DERM
LOCATION ZONE: FACE
LOCATION ZONE: FINGER

## 2022-09-07 NOTE — PROCEDURE: EXCIMER LASER
Consent: Written consent obtained, risks reviewed including but not limited to crusting, scabbing, blistering, scarring, darker or lighter pigmentary change, incidental hair removal, bruising, and/or incomplete removal.
Mode: tile
Post-Care Instructions: I reviewed with the patient in detail post-care instructions. Patient should stay away from the sun and wear sun protection until treated areas are fully healed.
Device Serial Number (Optional): xtrac/ jh
Detail Level: Detailed
Location #1: face and fingers
Comments: [facility] id is [id number]
Total Square Area In Cm2 (Required For Proper Billing- Whole Numbers Only Please): [address]
Fluence #1 (J/Cm2 Or Mj/Cm2): 703 mj hold today
Fluence Units: mJ/cm2
Spot Size: 3 x 3 cm
Treatment Number: [address] 1634

## 2022-09-16 ENCOUNTER — APPOINTMENT (RX ONLY)
Dept: URBAN - METROPOLITAN AREA CLINIC 116 | Facility: CLINIC | Age: 67
Setting detail: DERMATOLOGY
End: 2022-09-16

## 2022-09-23 ENCOUNTER — APPOINTMENT (RX ONLY)
Dept: URBAN - METROPOLITAN AREA CLINIC 116 | Facility: CLINIC | Age: 67
Setting detail: DERMATOLOGY
End: 2022-09-23

## 2022-09-23 DIAGNOSIS — L30.9 DERMATITIS, UNSPECIFIED: ICD-10-CM

## 2022-09-23 PROCEDURE — ? DIAGNOSIS COMMENT

## 2022-09-23 PROCEDURE — ? PRESCRIPTION MEDICATION MANAGEMENT

## 2022-09-23 PROCEDURE — ? COUNSELING

## 2022-09-23 PROCEDURE — ? PRESCRIPTION

## 2022-09-23 PROCEDURE — 99213 OFFICE O/P EST LOW 20 MIN: CPT

## 2022-09-23 RX ORDER — MUPIROCIN 20 MG/G
OINTMENT TOPICAL
Qty: 22 | Refills: 1 | Status: ERX | COMMUNITY
Start: 2022-09-23

## 2022-09-23 RX ADMIN — MUPIROCIN: 20 OINTMENT TOPICAL at 00:00

## 2022-09-23 ASSESSMENT — LOCATION DETAILED DESCRIPTION DERM
LOCATION DETAILED: LEFT ANTERIOR PROXIMAL THIGH
LOCATION DETAILED: RIGHT ANTERIOR PROXIMAL THIGH

## 2022-09-23 ASSESSMENT — LOCATION SIMPLE DESCRIPTION DERM
LOCATION SIMPLE: LEFT THIGH
LOCATION SIMPLE: RIGHT THIGH

## 2022-09-23 ASSESSMENT — LOCATION ZONE DERM: LOCATION ZONE: LEG

## 2022-09-23 NOTE — PROCEDURE: PRESCRIPTION MEDICATION MANAGEMENT
Detail Level: Zone
Render In Strict Bullet Format?: No
Initiate Treatment: Sensitive skin care regimen \\nApply Mupirocin ointment daily to spots on thighs, keep covered with bandage

## 2022-10-11 ENCOUNTER — APPOINTMENT (RX ONLY)
Dept: URBAN - METROPOLITAN AREA CLINIC 116 | Facility: CLINIC | Age: 67
Setting detail: DERMATOLOGY
End: 2022-10-11

## 2022-10-11 DIAGNOSIS — L98419 CHRONIC ULCER OF OTHER SPECIFIED SITES: ICD-10-CM

## 2022-10-11 DIAGNOSIS — L80 VITILIGO: ICD-10-CM

## 2022-10-11 DIAGNOSIS — L98429 CHRONIC ULCER OF OTHER SPECIFIED SITES: ICD-10-CM

## 2022-10-11 PROBLEM — L97.129 NON-PRESSURE CHRONIC ULCER OF LEFT THIGH WITH UNSPECIFIED SEVERITY: Status: ACTIVE | Noted: 2022-10-11

## 2022-10-11 PROCEDURE — ? ORDER TESTS

## 2022-10-11 PROCEDURE — ? PRESCRIPTION MEDICATION MANAGEMENT

## 2022-10-11 PROCEDURE — 99214 OFFICE O/P EST MOD 30 MIN: CPT

## 2022-10-11 PROCEDURE — ? FOLLOW UP FOR NEXT VISIT

## 2022-10-11 PROCEDURE — ? MEDICAL CONSULTATION: EXCIMER LASER

## 2022-10-11 PROCEDURE — ? COUNSELING

## 2022-10-11 ASSESSMENT — LOCATION DETAILED DESCRIPTION DERM
LOCATION DETAILED: RIGHT UPPER CUTANEOUS LIP
LOCATION DETAILED: LEFT RING FINGER DISTAL INTERPHALANGEAL JOINT
LOCATION DETAILED: LEFT SUPERIOR VERMILION LIP
LOCATION DETAILED: LEFT DISTAL DORSAL SMALL FINGER
LOCATION DETAILED: RIGHT LATERAL EYEBROW
LOCATION DETAILED: LEFT INFERIOR VERMILION LIP
LOCATION DETAILED: RIGHT MID DORSAL SMALL FINGER
LOCATION DETAILED: LEFT ANTERIOR DISTAL THIGH
LOCATION DETAILED: LEFT LATERAL EYEBROW
LOCATION DETAILED: LEFT INFERIOR LATERAL FOREHEAD

## 2022-10-11 ASSESSMENT — LOCATION ZONE DERM
LOCATION ZONE: LIP
LOCATION ZONE: FINGER
LOCATION ZONE: LEG
LOCATION ZONE: FACE

## 2022-10-11 ASSESSMENT — LOCATION SIMPLE DESCRIPTION DERM
LOCATION SIMPLE: LEFT THIGH
LOCATION SIMPLE: LEFT RING FINGER
LOCATION SIMPLE: LEFT EYEBROW
LOCATION SIMPLE: LEFT LIP
LOCATION SIMPLE: RIGHT EYEBROW
LOCATION SIMPLE: RIGHT SMALL FINGER
LOCATION SIMPLE: LEFT FOREHEAD
LOCATION SIMPLE: LEFT SMALL FINGER
LOCATION SIMPLE: RIGHT LIP

## 2022-10-11 NOTE — PROCEDURE: PRESCRIPTION MEDICATION MANAGEMENT
Detail Level: Zone
Render In Strict Bullet Format?: No
Samples Given: Opzelura cream - apply once a day
Continue Regimen: Lidex ointment Daily Monday- Thursday at night\\nProtopic daily to lip\\nLaser treatment once a week
Plan: Bx in reserve
Detail Level: Simple
Continue Regimen: Mupirocin oitment - apply twice daily

## 2022-10-11 NOTE — PROCEDURE: FOLLOW UP FOR NEXT VISIT
Scheduled For Follow Up In (Optional): 2 weeks
Detail Level: Detailed
Instructions (Optional): Recheck

## 2022-10-11 NOTE — PROCEDURE: ORDER TESTS
Bill For Surgical Tray: no
Performing Laboratory: -[id number]
Billing Type: [employer]
Expected Date Of Service: 10/11/2022
Clinical Notes (To The Lab): AOC: L08.9, R20.8, A49. 9.

## 2022-10-14 ENCOUNTER — APPOINTMENT (RX ONLY)
Dept: URBAN - METROPOLITAN AREA CLINIC 116 | Facility: CLINIC | Age: 67
Setting detail: DERMATOLOGY
End: 2022-10-14

## 2022-10-14 DIAGNOSIS — L80 VITILIGO: ICD-10-CM

## 2022-10-14 PROCEDURE — 96920 EXCIMER LSR PSRIASIS<250SQCM: CPT

## 2022-10-14 PROCEDURE — ? EXCIMER LASER

## 2022-10-14 ASSESSMENT — LOCATION ZONE DERM
LOCATION ZONE: FINGER
LOCATION ZONE: FACE

## 2022-10-14 NOTE — PROCEDURE: EXCIMER LASER
Consent: Written consent obtained, risks reviewed including but not limited to crusting, scabbing, blistering, scarring, darker or lighter pigmentary change, incidental hair removal, bruising, and/or incomplete removal.
Mode: tile
Post-Care Instructions: I reviewed with the patient in detail post-care instructions. Patient should stay away from the sun and wear sun protection until treated areas are fully healed.
Device Serial Number (Optional): xtrac/ jh
Detail Level: Detailed
Location #1: face and fingers
Comments: [facility] id is [id number]
Total Square Area In Cm2 (Required For Proper Billing- Whole Numbers Only Please): [address]
Fluence #1 (J/Cm2 Or Mj/Cm2): Lonny Montes
Fluence Units: mJ/cm2
Spot Size: 3 x 3 cm
Treatment Number: [facility]

## 2022-10-21 ENCOUNTER — RX ONLY (OUTPATIENT)
Age: 67
Setting detail: RX ONLY
End: 2022-10-21

## 2022-10-21 ENCOUNTER — APPOINTMENT (RX ONLY)
Dept: URBAN - METROPOLITAN AREA CLINIC 116 | Facility: CLINIC | Age: 67
Setting detail: DERMATOLOGY
End: 2022-10-21

## 2022-10-21 DIAGNOSIS — L80 VITILIGO: ICD-10-CM

## 2022-10-21 PROCEDURE — ? EXCIMER LASER

## 2022-10-21 PROCEDURE — 96920 EXCIMER LSR PSRIASIS<250SQCM: CPT

## 2022-10-21 RX ORDER — DOXYCYCLINE HYCLATE 100 MG/1
CAPSULE, GELATIN COATED ORAL
Qty: 14 | Refills: 0 | Status: ERX | COMMUNITY
Start: 2022-10-21

## 2022-10-21 ASSESSMENT — LOCATION ZONE DERM
LOCATION ZONE: FACE
LOCATION ZONE: FINGER

## 2022-10-21 NOTE — PROCEDURE: EXCIMER LASER
Consent: Written consent obtained, risks reviewed including but not limited to crusting, scabbing, blistering, scarring, darker or lighter pigmentary change, incidental hair removal, bruising, and/or incomplete removal.
Mode: tile
Post-Care Instructions: I reviewed with the patient in detail post-care instructions. Patient should stay away from the sun and wear sun protection until treated areas are fully healed.
Device Serial Number (Optional): [email or web address]/ jh
Detail Level: Detailed
Location #1: face and fingers
Comments: [facility] id is [id number]
Total Square Area In Cm2 (Required For Proper Billing- Whole Numbers Only Please): [address] [facility] 
Fluence #1 (J/Cm2 Or Mj/Cm2): [address]
Fluence Units: mJ/cm2
Spot Size: 3 x 3 cm
Treatment Number: One [facility]

## 2022-10-25 ENCOUNTER — APPOINTMENT (RX ONLY)
Dept: URBAN - METROPOLITAN AREA CLINIC 116 | Facility: CLINIC | Age: 67
Setting detail: DERMATOLOGY
End: 2022-10-25

## 2022-10-25 DIAGNOSIS — L98419 CHRONIC ULCER OF OTHER SPECIFIED SITES: ICD-10-CM

## 2022-10-25 DIAGNOSIS — L98429 CHRONIC ULCER OF OTHER SPECIFIED SITES: ICD-10-CM

## 2022-10-25 PROBLEM — L97.129 NON-PRESSURE CHRONIC ULCER OF LEFT THIGH WITH UNSPECIFIED SEVERITY: Status: ACTIVE | Noted: 2022-10-25

## 2022-10-25 PROCEDURE — ? PRESCRIPTION

## 2022-10-25 PROCEDURE — ? COUNSELING

## 2022-10-25 PROCEDURE — ? PHOTO-DOCUMENTATION

## 2022-10-25 PROCEDURE — ? PRESCRIPTION MEDICATION MANAGEMENT

## 2022-10-25 PROCEDURE — 99213 OFFICE O/P EST LOW 20 MIN: CPT

## 2022-10-25 RX ORDER — CLOBETASOL PROPIONATE 0.5 MG/G
1 CREAM TOPICAL BID
Qty: 45 | Refills: 2 | Status: ERX | COMMUNITY
Start: 2022-10-25

## 2022-10-25 RX ORDER — SILVER SULFADIAZINE 10 MG/G
CREAM TOPICAL
Qty: 50 | Refills: 2 | Status: ERX | COMMUNITY
Start: 2022-10-25

## 2022-10-25 RX ADMIN — SILVER SULFADIAZINE: 10 CREAM TOPICAL at 00:00

## 2022-10-25 RX ADMIN — CLOBETASOL PROPIONATE 1: 0.5 CREAM TOPICAL at 00:00

## 2022-10-25 ASSESSMENT — LOCATION SIMPLE DESCRIPTION DERM: LOCATION SIMPLE: LEFT THIGH

## 2022-10-25 ASSESSMENT — LOCATION ZONE DERM: LOCATION ZONE: LEG

## 2022-10-25 ASSESSMENT — LOCATION DETAILED DESCRIPTION DERM: LOCATION DETAILED: LEFT ANTERIOR DISTAL THIGH

## 2022-10-25 NOTE — PROCEDURE: PRESCRIPTION MEDICATION MANAGEMENT
Plan: Bx in reserve, pt declines bx today
Render In Strict Bullet Format?: No
Initiate Treatment: Clobetasol cream twice a day \\nSilvadene cream twice a day \\nOtc lidocaine cream/gel as directed
Detail Level: Simple

## 2022-10-28 ENCOUNTER — APPOINTMENT (RX ONLY)
Dept: URBAN - METROPOLITAN AREA CLINIC 116 | Facility: CLINIC | Age: 67
Setting detail: DERMATOLOGY
End: 2022-10-28

## 2022-10-28 DIAGNOSIS — L80 VITILIGO: ICD-10-CM

## 2022-10-28 PROCEDURE — ? EXCIMER LASER

## 2022-10-28 PROCEDURE — 96920 EXCIMER LSR PSRIASIS<250SQCM: CPT

## 2022-10-28 ASSESSMENT — LOCATION ZONE DERM
LOCATION ZONE: FINGER
LOCATION ZONE: FACE

## 2022-10-28 NOTE — PROCEDURE: EXCIMER LASER
Consent: Written consent obtained, risks reviewed including but not limited to crusting, scabbing, blistering, scarring, darker or lighter pigmentary change, incidental hair removal, bruising, and/or incomplete removal.
Mode: tile
Post-Care Instructions: I reviewed with the patient in detail post-care instructions. Patient should stay away from the sun and wear sun protection until treated areas are fully healed.
Device Serial Number (Optional): [email or web address]/ jh
Detail Level: Detailed
Location #1: face and fingers
Comments: [facility] id is [id number]
Total Square Area In Cm2 (Required For Proper Billing- Whole Numbers Only Please): [address]
Fluence #1 (J/Cm2 Or Mj/Cm2): [address] [facility]
Fluence Units: mJ/cm2
Spot Size: 3 x 3 cm
Treatment Number: [address] [facility]

## 2022-11-04 ENCOUNTER — APPOINTMENT (RX ONLY)
Dept: URBAN - METROPOLITAN AREA CLINIC 116 | Facility: CLINIC | Age: 67
Setting detail: DERMATOLOGY
End: 2022-11-04

## 2022-11-04 DIAGNOSIS — L80 VITILIGO: ICD-10-CM

## 2022-11-04 PROCEDURE — ? EXCIMER LASER

## 2022-11-04 PROCEDURE — 96920 EXCIMER LSR PSRIASIS<250SQCM: CPT

## 2022-11-04 ASSESSMENT — LOCATION ZONE DERM
LOCATION ZONE: FACE
LOCATION ZONE: FINGER

## 2022-11-04 NOTE — PROCEDURE: EXCIMER LASER
Consent: Written consent obtained, risks reviewed including but not limited to crusting, scabbing, blistering, scarring, darker or lighter pigmentary change, incidental hair removal, bruising, and/or incomplete removal.
Mode: tile
Post-Care Instructions: I reviewed with the patient in detail post-care instructions. Patient should stay away from the sun and wear sun protection until treated areas are fully healed.
Device Serial Number (Optional): [email or web address]/ jh
Detail Level: Detailed
Location #1: face and fingers
Comments: [facility] id is [id number]
Total Square Area In Cm2 (Required For Proper Billing- Whole Numbers Only Please): [address]
Fluence #1 (J/Cm2 Or Mj/Cm2): [address]
Fluence Units: mJ/cm2
Spot Size: 3 x 3 cm
Treatment Number: [address]

## 2022-11-14 ENCOUNTER — APPOINTMENT (RX ONLY)
Dept: URBAN - METROPOLITAN AREA CLINIC 116 | Facility: CLINIC | Age: 67
Setting detail: DERMATOLOGY
End: 2022-11-14

## 2022-11-14 DIAGNOSIS — L80 VITILIGO: ICD-10-CM

## 2022-11-14 PROCEDURE — 96920 EXCIMER LSR PSRIASIS<250SQCM: CPT

## 2022-11-14 PROCEDURE — ? EXCIMER LASER

## 2022-11-14 ASSESSMENT — LOCATION ZONE DERM
LOCATION ZONE: FACE
LOCATION ZONE: FINGER

## 2022-11-14 NOTE — PROCEDURE: EXCIMER LASER
Consent: Written consent obtained, risks reviewed including but not limited to crusting, scabbing, blistering, scarring, darker or lighter pigmentary change, incidental hair removal, bruising, and/or incomplete removal.
Mode: tile
Post-Care Instructions: I reviewed with the patient in detail post-care instructions. Patient should stay away from the sun and wear sun protection until treated areas are fully healed.
Device Serial Number (Optional): [email or web address]/ jh
Detail Level: Detailed
Location #1: face and fingers
Comments: [facility] id is [id number]
Total Square Area In Cm2 (Required For Proper Billing- Whole Numbers Only Please): 80
Fluence #1 (J/Cm2 Or Mj/Cm2): [address]
Fluence Units: mJ/cm2
Spot Size: 3 x 3 cm
Treatment Number: 88

## 2022-11-17 ENCOUNTER — APPOINTMENT (RX ONLY)
Dept: URBAN - METROPOLITAN AREA CLINIC 116 | Facility: CLINIC | Age: 67
Setting detail: DERMATOLOGY
End: 2022-11-17

## 2022-11-17 DIAGNOSIS — L98419 CHRONIC ULCER OF OTHER SPECIFIED SITES: ICD-10-CM

## 2022-11-17 DIAGNOSIS — L98429 CHRONIC ULCER OF OTHER SPECIFIED SITES: ICD-10-CM

## 2022-11-17 PROBLEM — L97.129 NON-PRESSURE CHRONIC ULCER OF LEFT THIGH WITH UNSPECIFIED SEVERITY: Status: ACTIVE | Noted: 2022-11-17

## 2022-11-17 PROCEDURE — ? PRESCRIPTION MEDICATION MANAGEMENT

## 2022-11-17 PROCEDURE — ? REFERRAL

## 2022-11-17 PROCEDURE — ? COUNSELING

## 2022-11-17 PROCEDURE — 99213 OFFICE O/P EST LOW 20 MIN: CPT

## 2022-11-17 ASSESSMENT — LOCATION DETAILED DESCRIPTION DERM: LOCATION DETAILED: LEFT ANTERIOR DISTAL THIGH

## 2022-11-17 ASSESSMENT — LOCATION SIMPLE DESCRIPTION DERM: LOCATION SIMPLE: LEFT THIGH

## 2022-11-17 ASSESSMENT — LOCATION ZONE DERM: LOCATION ZONE: LEG

## 2022-11-17 NOTE — PROCEDURE: PRESCRIPTION MEDICATION MANAGEMENT
Plan: Discussed Bx and wound care
Render In Strict Bullet Format?: No
Detail Level: Simple
Continue Regimen: Clobetasol cream twice a day \\nSilvadene cream twice a day \\nOtc lidocaine cream/gel as directed

## 2022-11-18 ENCOUNTER — APPOINTMENT (RX ONLY)
Dept: URBAN - METROPOLITAN AREA CLINIC 116 | Facility: CLINIC | Age: 67
Setting detail: DERMATOLOGY
End: 2022-11-18

## 2022-11-18 DIAGNOSIS — L80 VITILIGO: ICD-10-CM

## 2022-11-18 PROCEDURE — 96920 EXCIMER LSR PSRIASIS<250SQCM: CPT

## 2022-11-18 PROCEDURE — ? EXCIMER LASER

## 2022-11-18 ASSESSMENT — LOCATION ZONE DERM
LOCATION ZONE: FINGER
LOCATION ZONE: FACE

## 2022-11-18 NOTE — PROCEDURE: EXCIMER LASER
Consent: Written consent obtained, risks reviewed including but not limited to crusting, scabbing, blistering, scarring, darker or lighter pigmentary change, incidental hair removal, bruising, and/or incomplete removal.
Mode: tile
Post-Care Instructions: I reviewed with the patient in detail post-care instructions. Patient should stay away from the sun and wear sun protection until treated areas are fully healed.
Device Serial Number (Optional): xtrac/ jh
Detail Level: Detailed
Location #1: face and fingers
Comments: [facility] id is [id number]
Total Square Area In Cm2 (Required For Proper Billing- Whole Numbers Only Please): 80
Fluence #1 (J/Cm2 Or Mj/Cm2): [address] [place] [address]
Fluence Units: mJ/cm2
Spot Size: 3 x 3 cm
Treatment Number: 88

## 2022-11-30 ENCOUNTER — APPOINTMENT (RX ONLY)
Dept: URBAN - METROPOLITAN AREA CLINIC 116 | Facility: CLINIC | Age: 67
Setting detail: DERMATOLOGY
End: 2022-11-30

## 2022-11-30 DIAGNOSIS — L80 VITILIGO: ICD-10-CM

## 2022-11-30 PROCEDURE — 96920 EXCIMER LSR PSRIASIS<250SQCM: CPT

## 2022-11-30 PROCEDURE — ? EXCIMER LASER

## 2022-11-30 ASSESSMENT — LOCATION ZONE DERM
LOCATION ZONE: FINGER
LOCATION ZONE: FACE

## 2022-11-30 NOTE — PROCEDURE: EXCIMER LASER
Consent: Written consent obtained, risks reviewed including but not limited to crusting, scabbing, blistering, scarring, darker or lighter pigmentary change, incidental hair removal, bruising, and/or incomplete removal.
Mode: tile
Post-Care Instructions: I reviewed with the patient in detail post-care instructions. Patient should stay away from the sun and wear sun protection until treated areas are fully healed.
Device Serial Number (Optional): xtrac/ jh
Detail Level: Detailed
Location #1: face and fingers
Comments: [facility] id is [id number]
Total Square Area In Cm2 (Required For Proper Billing- Whole Numbers Only Please): 80
Fluence #1 (J/Cm2 Or Mj/Cm2): [address]
Fluence Units: mJ/cm2
Spot Size: 3 x 3 cm
Treatment Number: [address]

## 2022-12-09 ENCOUNTER — APPOINTMENT (RX ONLY)
Dept: URBAN - METROPOLITAN AREA CLINIC 116 | Facility: CLINIC | Age: 67
Setting detail: DERMATOLOGY
End: 2022-12-09

## 2022-12-09 DIAGNOSIS — L80 VITILIGO: ICD-10-CM

## 2022-12-09 PROCEDURE — 96920 EXCIMER LSR PSRIASIS<250SQCM: CPT

## 2022-12-09 PROCEDURE — ? EXCIMER LASER

## 2022-12-09 ASSESSMENT — LOCATION ZONE DERM
LOCATION ZONE: FACE
LOCATION ZONE: FINGER

## 2022-12-09 NOTE — PROCEDURE: EXCIMER LASER
Consent: Written consent obtained, risks reviewed including but not limited to crusting, scabbing, blistering, scarring, darker or lighter pigmentary change, incidental hair removal, bruising, and/or incomplete removal.
Mode: tile
Post-Care Instructions: I reviewed with the patient in detail post-care instructions. Patient should stay away from the sun and wear sun protection until treated areas are fully healed.
Device Serial Number (Optional): xtrac/ jh
Detail Level: Detailed
Location #1: face and fingers
Comments: [facility] id is [id number]
Total Square Area In Cm2 (Required For Proper Billing- Whole Numbers Only Please): [address]
Fluence #1 (J/Cm2 Or Mj/Cm2): [address]
Fluence Units: mJ/cm2
Spot Size: 3 x 3 cm
Treatment Number: [address]

## 2022-12-16 ENCOUNTER — APPOINTMENT (RX ONLY)
Dept: URBAN - METROPOLITAN AREA CLINIC 116 | Facility: CLINIC | Age: 67
Setting detail: DERMATOLOGY
End: 2022-12-16

## 2022-12-16 DIAGNOSIS — L80 VITILIGO: ICD-10-CM

## 2022-12-16 PROCEDURE — ? EXCIMER LASER

## 2022-12-16 PROCEDURE — 96920 EXCIMER LSR PSRIASIS<250SQCM: CPT

## 2022-12-16 ASSESSMENT — LOCATION ZONE DERM
LOCATION ZONE: FACE
LOCATION ZONE: FINGER

## 2022-12-16 NOTE — PROCEDURE: EXCIMER LASER
Consent: Written consent obtained, risks reviewed including but not limited to crusting, scabbing, blistering, scarring, darker or lighter pigmentary change, incidental hair removal, bruising, and/or incomplete removal.
Mode: tile
Post-Care Instructions: I reviewed with the patient in detail post-care instructions. Patient should stay away from the sun and wear sun protection until treated areas are fully healed.
Device Serial Number (Optional): xtrac/ jh
Detail Level: Detailed
Location #1: face and fingers
Comments: [facility] id is [id number]
Total Square Area In Cm2 (Required For Proper Billing- Whole Numbers Only Please): 80
Fluence #1 (J/Cm2 Or Mj/Cm2): 1369 mj
Fluence Units: mJ/cm2
Spot Size: 3 x 3 cm
Treatment Number: 88

## 2022-12-21 ENCOUNTER — APPOINTMENT (RX ONLY)
Dept: URBAN - METROPOLITAN AREA CLINIC 116 | Facility: CLINIC | Age: 67
Setting detail: DERMATOLOGY
End: 2022-12-21

## 2022-12-21 DIAGNOSIS — L80 VITILIGO: ICD-10-CM

## 2022-12-21 PROCEDURE — 96920 EXCIMER LSR PSRIASIS<250SQCM: CPT

## 2022-12-21 PROCEDURE — ? EXCIMER LASER

## 2022-12-21 ASSESSMENT — LOCATION ZONE DERM
LOCATION ZONE: FINGER
LOCATION ZONE: FACE

## 2022-12-21 NOTE — PROCEDURE: EXCIMER LASER
Consent: Written consent obtained, risks reviewed including but not limited to crusting, scabbing, blistering, scarring, darker or lighter pigmentary change, incidental hair removal, bruising, and/or incomplete removal.
Mode: tile
Post-Care Instructions: I reviewed with the patient in detail post-care instructions. Patient should stay away from the sun and wear sun protection until treated areas are fully healed.
Device Serial Number (Optional): xtrac/ jh
Detail Level: Detailed
Location #1: face and fingers
Comments: [facility] id is [id number]
Total Square Area In Cm2 (Required For Proper Billing- Whole Numbers Only Please): [address]
Fluence #1 (J/Cm2 Or Mj/Cm2): 1400  mj
Fluence Units: mJ/cm2
Spot Size: 3 x 3 cm
Treatment Number: 88

## 2023-01-06 ENCOUNTER — APPOINTMENT (RX ONLY)
Dept: URBAN - METROPOLITAN AREA CLINIC 116 | Facility: CLINIC | Age: 68
Setting detail: DERMATOLOGY
End: 2023-01-06

## 2023-01-06 DIAGNOSIS — L80 VITILIGO: ICD-10-CM

## 2023-01-06 PROCEDURE — ? EXCIMER LASER

## 2023-01-06 PROCEDURE — 96920 EXCIMER LSR PSRIASIS<250SQCM: CPT

## 2023-01-06 ASSESSMENT — LOCATION ZONE DERM
LOCATION ZONE: FINGER
LOCATION ZONE: FACE

## 2023-01-06 NOTE — PROCEDURE: EXCIMER LASER
Consent: Written consent obtained, risks reviewed including but not limited to crusting, scabbing, blistering, scarring, darker or lighter pigmentary change, incidental hair removal, bruising, and/or incomplete removal.
Mode: tile
Post-Care Instructions: I reviewed with the patient in detail post-care instructions. Patient should stay away from the sun and wear sun protection until treated areas are fully healed.
Device Serial Number (Optional): xtrac/ jh
Detail Level: Detailed
Location #1: face and fingers
Comments: [facility] id is [id number]
Total Square Area In Cm2 (Required For Proper Billing- Whole Numbers Only Please): [address]
Fluence #1 (J/Cm2 Or Mj/Cm2): 1350 mj
Fluence Units: mJ/cm2
Spot Size: 3 x 3 cm
Treatment Number: [address]

## 2023-01-13 ENCOUNTER — APPOINTMENT (RX ONLY)
Dept: URBAN - METROPOLITAN AREA CLINIC 116 | Facility: CLINIC | Age: 68
Setting detail: DERMATOLOGY
End: 2023-01-13

## 2023-01-13 DIAGNOSIS — L80 VITILIGO: ICD-10-CM

## 2023-01-13 PROCEDURE — 96920 EXCIMER LSR PSRIASIS<250SQCM: CPT

## 2023-01-13 PROCEDURE — ? EXCIMER LASER

## 2023-01-13 ASSESSMENT — LOCATION ZONE DERM
LOCATION ZONE: FINGER
LOCATION ZONE: FACE

## 2023-01-13 NOTE — PROCEDURE: EXCIMER LASER
Consent: Written consent obtained, risks reviewed including but not limited to crusting, scabbing, blistering, scarring, darker or lighter pigmentary change, incidental hair removal, bruising, and/or incomplete removal.
Mode: tile
Post-Care Instructions: I reviewed with the patient in detail post-care instructions. Patient should stay away from the sun and wear sun protection until treated areas are fully healed.
Device Serial Number (Optional): xtrac/ jh
Detail Level: Detailed
Location #1: face and fingers
Comments: [facility] id is [id number]
Total Square Area In Cm2 (Required For Proper Billing- Whole Numbers Only Please): [address]
Fluence #1 (J/Cm2 Or Mj/Cm2): 1400 mj
Fluence Units: mJ/cm2
Spot Size: 3 x 3 cm
Treatment Number: [address]

## 2023-01-23 ENCOUNTER — APPOINTMENT (RX ONLY)
Dept: URBAN - METROPOLITAN AREA CLINIC 116 | Facility: CLINIC | Age: 68
Setting detail: DERMATOLOGY
End: 2023-01-23

## 2023-01-23 DIAGNOSIS — L80 VITILIGO: ICD-10-CM

## 2023-01-23 PROCEDURE — 96920 EXCIMER LSR PSRIASIS<250SQCM: CPT

## 2023-01-23 PROCEDURE — ? EXCIMER LASER

## 2023-01-23 ASSESSMENT — LOCATION ZONE DERM
LOCATION ZONE: FACE
LOCATION ZONE: FINGER

## 2023-01-23 NOTE — PROCEDURE: EXCIMER LASER
Consent: Written consent obtained, risks reviewed including but not limited to crusting, scabbing, blistering, scarring, darker or lighter pigmentary change, incidental hair removal, bruising, and/or incomplete removal.
Mode: tile
Post-Care Instructions: I reviewed with the patient in detail post-care instructions. Patient should stay away from the sun and wear sun protection until treated areas are fully healed.
Device Serial Number (Optional): [email or web address]/ jh
Detail Level: Detailed
Location #1: face and fingers
Comments: [facility] id is [id number]
Total Square Area In Cm2 (Required For Proper Billing- Whole Numbers Only Please): [address]
Fluence #1 (J/Cm2 Or Mj/Cm2): 1400 mj held today.       Id is [id number]
Fluence Units: mJ/cm2
Spot Size: 3 x 3 cm
Treatment Number: 92

## 2023-02-03 ENCOUNTER — APPOINTMENT (RX ONLY)
Dept: URBAN - METROPOLITAN AREA CLINIC 116 | Facility: CLINIC | Age: 68
Setting detail: DERMATOLOGY
End: 2023-02-03

## 2023-02-03 DIAGNOSIS — L80 VITILIGO: ICD-10-CM

## 2023-02-03 PROCEDURE — ? EXCIMER LASER

## 2023-02-03 PROCEDURE — 96920 EXCIMER LSR PSRIASIS<250SQCM: CPT

## 2023-02-03 ASSESSMENT — LOCATION ZONE DERM
LOCATION ZONE: FACE
LOCATION ZONE: FINGER

## 2023-02-03 NOTE — PROCEDURE: EXCIMER LASER
Consent: Written consent obtained, risks reviewed including but not limited to crusting, scabbing, blistering, scarring, darker or lighter pigmentary change, incidental hair removal, bruising, and/or incomplete removal.
Mode: tile
Post-Care Instructions: I reviewed with the patient in detail post-care instructions. Patient should stay away from the sun and wear sun protection until treated areas are fully healed.
Device Serial Number (Optional): [email or web address]/ jh
Detail Level: Detailed
Location #1: face and fingers
Comments: [facility] id is [id number]
Total Square Area In Cm2 (Required For Proper Billing- Whole Numbers Only Please): 88
Fluence #1 (J/Cm2 Or Mj/Cm2): 700 MJ Id is [id number]
Fluence Units: mJ/cm2
Spot Size: 3 x 3 cm
Treatment Number: [address]

## 2023-02-10 ENCOUNTER — APPOINTMENT (RX ONLY)
Dept: URBAN - METROPOLITAN AREA CLINIC 116 | Facility: CLINIC | Age: 68
Setting detail: DERMATOLOGY
End: 2023-02-10

## 2023-02-10 DIAGNOSIS — L80 VITILIGO: ICD-10-CM

## 2023-02-10 PROCEDURE — 96920 EXCIMER LSR PSRIASIS<250SQCM: CPT

## 2023-02-10 PROCEDURE — ? EXCIMER LASER

## 2023-02-10 ASSESSMENT — LOCATION ZONE DERM
LOCATION ZONE: FACE
LOCATION ZONE: FINGER

## 2023-02-10 NOTE — PROCEDURE: EXCIMER LASER
Consent: Written consent obtained, risks reviewed including but not limited to crusting, scabbing, blistering, scarring, darker or lighter pigmentary change, incidental hair removal, bruising, and/or incomplete removal.
Mode: tile
Post-Care Instructions: I reviewed with the patient in detail post-care instructions. Patient should stay away from the sun and wear sun protection until treated areas are fully healed.
Device Serial Number (Optional): [email or web address]/ jh
Detail Level: Detailed
Location #1: face and fingers
Comments: [facility] id is [id number]
Total Square Area In Cm2 (Required For Proper Billing- Whole Numbers Only Please): 88
Fluence #1 (J/Cm2 Or Mj/Cm2): 750 MJ Id is [id number]
Fluence Units: mJ/cm2
Spot Size: 3 x 3 cm
Treatment Number: 94

## 2023-02-22 ENCOUNTER — APPOINTMENT (RX ONLY)
Dept: URBAN - METROPOLITAN AREA CLINIC 116 | Facility: CLINIC | Age: 68
Setting detail: DERMATOLOGY
End: 2023-02-22

## 2023-02-22 DIAGNOSIS — L80 VITILIGO: ICD-10-CM

## 2023-02-22 PROCEDURE — 96920 EXCIMER LSR PSRIASIS<250SQCM: CPT

## 2023-02-22 PROCEDURE — ? EXCIMER LASER

## 2023-02-22 ASSESSMENT — LOCATION ZONE DERM
LOCATION ZONE: FACE
LOCATION ZONE: FINGER

## 2023-02-22 NOTE — PROCEDURE: EXCIMER LASER
Consent: Written consent obtained, risks reviewed including but not limited to crusting, scabbing, blistering, scarring, darker or lighter pigmentary change, incidental hair removal, bruising, and/or incomplete removal.
Mode: tile
Post-Care Instructions: I reviewed with the patient in detail post-care instructions. Patient should stay away from the sun and wear sun protection until treated areas are fully healed.
Device Serial Number (Optional): [email or web address]/ jh
Detail Level: Detailed
Location #1: face and fingers
Comments: [facility] id is [id number]
Total Square Area In Cm2 (Required For Proper Billing- Whole Numbers Only Please): [address]
Fluence #1 (J/Cm2 Or Mj/Cm2): 800 MJ Id is [id number]
Fluence Units: mJ/cm2
Spot Size: 3 x 3 cm
Treatment Number: 94

## 2023-02-23 ENCOUNTER — APPOINTMENT (RX ONLY)
Dept: URBAN - METROPOLITAN AREA CLINIC 116 | Facility: CLINIC | Age: 68
Setting detail: DERMATOLOGY
End: 2023-02-23

## 2023-02-23 DIAGNOSIS — L98419 CHRONIC ULCER OF OTHER SPECIFIED SITES: ICD-10-CM

## 2023-02-23 DIAGNOSIS — L80 VITILIGO: ICD-10-CM

## 2023-02-23 DIAGNOSIS — L98429 CHRONIC ULCER OF OTHER SPECIFIED SITES: ICD-10-CM

## 2023-02-23 PROBLEM — L97.129 NON-PRESSURE CHRONIC ULCER OF LEFT THIGH WITH UNSPECIFIED SEVERITY: Status: ACTIVE | Noted: 2023-02-23

## 2023-02-23 PROCEDURE — ? PRESCRIPTION MEDICATION MANAGEMENT

## 2023-02-23 PROCEDURE — 99214 OFFICE O/P EST MOD 30 MIN: CPT

## 2023-02-23 PROCEDURE — ? COUNSELING

## 2023-02-23 ASSESSMENT — LOCATION DETAILED DESCRIPTION DERM
LOCATION DETAILED: LEFT LATERAL EYEBROW
LOCATION DETAILED: LEFT DISTAL DORSAL SMALL FINGER
LOCATION DETAILED: RIGHT MID DORSAL SMALL FINGER
LOCATION DETAILED: LEFT SUPERIOR VERMILION LIP
LOCATION DETAILED: LEFT RING FINGER DISTAL INTERPHALANGEAL JOINT
LOCATION DETAILED: LEFT INFERIOR VERMILION LIP
LOCATION DETAILED: LEFT INFERIOR LATERAL FOREHEAD
LOCATION DETAILED: LEFT ANTERIOR DISTAL THIGH
LOCATION DETAILED: RIGHT UPPER CUTANEOUS LIP
LOCATION DETAILED: RIGHT LATERAL EYEBROW

## 2023-02-23 ASSESSMENT — LOCATION ZONE DERM
LOCATION ZONE: FINGER
LOCATION ZONE: LEG
LOCATION ZONE: LIP
LOCATION ZONE: FACE

## 2023-02-23 ASSESSMENT — LOCATION SIMPLE DESCRIPTION DERM
LOCATION SIMPLE: LEFT EYEBROW
LOCATION SIMPLE: LEFT RING FINGER
LOCATION SIMPLE: RIGHT SMALL FINGER
LOCATION SIMPLE: LEFT SMALL FINGER
LOCATION SIMPLE: RIGHT LIP
LOCATION SIMPLE: LEFT THIGH
LOCATION SIMPLE: LEFT LIP
LOCATION SIMPLE: LEFT FOREHEAD
LOCATION SIMPLE: RIGHT EYEBROW

## 2023-02-23 NOTE — PROCEDURE: PRESCRIPTION MEDICATION MANAGEMENT
Detail Level: Zone
Render In Strict Bullet Format?: No
Samples Given: Opzelura cream - apply once a day
Continue Regimen: Lidex ointment Daily Monday- Thursday at night\\nProtopic daily to lip
Discontinue Regimen: Xtrac laser, pt happy with results would like to discontinue
Plan: Discussed Bx and wound care
Detail Level: Simple
Continue Regimen: Clobetasol cream twice a day \\nSilvadene cream twice a day \\nOtc lidocaine cream/gel as directed